# Patient Record
Sex: FEMALE | Race: WHITE | Employment: OTHER | URBAN - METROPOLITAN AREA
[De-identification: names, ages, dates, MRNs, and addresses within clinical notes are randomized per-mention and may not be internally consistent; named-entity substitution may affect disease eponyms.]

---

## 2017-03-16 PROBLEM — M54.50 ACUTE LEFT-SIDED LOW BACK PAIN WITHOUT SCIATICA: Status: ACTIVE | Noted: 2017-03-16

## 2017-03-16 PROBLEM — M25.552 LEFT HIP PAIN: Status: ACTIVE | Noted: 2017-03-16

## 2017-05-16 PROCEDURE — 86376 MICROSOMAL ANTIBODY EACH: CPT | Performed by: ALLERGY & IMMUNOLOGY

## 2017-05-16 PROCEDURE — 86800 THYROGLOBULIN ANTIBODY: CPT | Performed by: ALLERGY & IMMUNOLOGY

## 2017-05-16 PROCEDURE — 88184 FLOWCYTOMETRY/ TC 1 MARKER: CPT | Performed by: ALLERGY & IMMUNOLOGY

## 2017-05-18 PROBLEM — M54.50 ACUTE LEFT-SIDED LOW BACK PAIN WITHOUT SCIATICA: Status: RESOLVED | Noted: 2017-03-16 | Resolved: 2017-05-18

## 2017-05-18 PROBLEM — M25.552 LEFT HIP PAIN: Status: RESOLVED | Noted: 2017-03-16 | Resolved: 2017-05-18

## 2017-06-22 ENCOUNTER — OFFICE VISIT (OUTPATIENT)
Dept: FAMILY MEDICINE CLINIC | Facility: CLINIC | Age: 62
End: 2017-06-22

## 2017-06-22 VITALS
BODY MASS INDEX: 43.41 KG/M2 | TEMPERATURE: 98 F | SYSTOLIC BLOOD PRESSURE: 136 MMHG | HEART RATE: 84 BPM | RESPIRATION RATE: 20 BRPM | WEIGHT: 245 LBS | HEIGHT: 63 IN | DIASTOLIC BLOOD PRESSURE: 86 MMHG | OXYGEN SATURATION: 98 %

## 2017-06-22 DIAGNOSIS — N30.00 ACUTE CYSTITIS WITHOUT HEMATURIA: Primary | ICD-10-CM

## 2017-06-22 PROCEDURE — 81003 URINALYSIS AUTO W/O SCOPE: CPT | Performed by: NURSE PRACTITIONER

## 2017-06-22 PROCEDURE — 87088 URINE BACTERIA CULTURE: CPT | Performed by: NURSE PRACTITIONER

## 2017-06-22 PROCEDURE — 87186 SC STD MICRODIL/AGAR DIL: CPT | Performed by: NURSE PRACTITIONER

## 2017-06-22 PROCEDURE — 99213 OFFICE O/P EST LOW 20 MIN: CPT | Performed by: NURSE PRACTITIONER

## 2017-06-22 PROCEDURE — 87086 URINE CULTURE/COLONY COUNT: CPT | Performed by: NURSE PRACTITIONER

## 2017-06-22 RX ORDER — PHENAZOPYRIDINE HYDROCHLORIDE 200 MG/1
200 TABLET, FILM COATED ORAL 3 TIMES DAILY PRN
Qty: 9 TABLET | Refills: 0 | Status: SHIPPED | OUTPATIENT
Start: 2017-06-22 | End: 2017-09-13 | Stop reason: ALTCHOICE

## 2017-06-22 RX ORDER — NITROFURANTOIN 25; 75 MG/1; MG/1
CAPSULE ORAL
Qty: 14 CAPSULE | Refills: 0 | Status: SHIPPED | OUTPATIENT
Start: 2017-06-22 | End: 2017-09-13 | Stop reason: ALTCHOICE

## 2017-06-22 NOTE — ADDENDUM NOTE
Addended byClotilda Cheadle on: 6/22/2017 10:10 AM     Modules accepted: Orders, Medications, Level of Service

## 2017-06-22 NOTE — PROGRESS NOTES
Penelope Nguyen is a 57year old female. CHIEF COMPLAINT:   Patient presents with:  UTI      HPI:   Patient presents with symptoms of UTI. Reports 1 day history of urinary frequency and dysuria.   Denies flank pain, hematuria, nausea, vomiting, fever or malaise Calculus of kidney    • Esophageal reflux    • Anesthesia complication      NAUSEA AND MUCH EMESIS   • PONV (postoperative nausea and vomiting)       Social History:    Smoking Status: Never Smoker                      Smokeless Status: Never Used in your urinary tract, they can cause infection in the urethra (urethritis), the bladder (cystitis), or the kidneys (pyelonephritis). The most common place for an infection is in the bladder. This is called a bladder infection.  This is one of the most com birth control   Treatment  Bladder infections are diagnosed by a urine test. They are treated with antibiotics and usually clear up quickly without complications. Treatment helps prevent a more serious kidney infection.   Medicines  Medicines can help in th all symptoms are not gone after 3 days of treatment. This is especially important if you have repeat infections. If a culture was done, you will be told if your treatment needs to be changed. If directed, you can call to find out the results.   If X-rays w

## 2017-09-19 ENCOUNTER — HOSPITAL (OUTPATIENT)
Dept: OTHER | Age: 62
End: 2017-09-19
Attending: FAMILY MEDICINE

## 2017-09-26 ENCOUNTER — HOSPITAL ENCOUNTER (OUTPATIENT)
Facility: HOSPITAL | Age: 62
Discharge: HOME | End: 2017-09-26
Attending: SURGERY | Admitting: SURGERY
Payer: COMMERCIAL

## 2017-09-26 ENCOUNTER — ANESTHESIA (OUTPATIENT)
Dept: SURGERY | Facility: HOSPITAL | Age: 62
End: 2017-09-26
Payer: COMMERCIAL

## 2017-09-26 ENCOUNTER — ANESTHESIA EVENT (OUTPATIENT)
Dept: SURGERY | Facility: HOSPITAL | Age: 62
End: 2017-09-26
Payer: COMMERCIAL

## 2017-09-26 ENCOUNTER — SURGERY (OUTPATIENT)
Age: 62
End: 2017-09-26

## 2017-09-26 VITALS
BODY MASS INDEX: 43.28 KG/M2 | TEMPERATURE: 98 F | HEART RATE: 75 BPM | RESPIRATION RATE: 16 BRPM | HEIGHT: 64 IN | OXYGEN SATURATION: 92 % | SYSTOLIC BLOOD PRESSURE: 106 MMHG | WEIGHT: 253.5 LBS | DIASTOLIC BLOOD PRESSURE: 63 MMHG

## 2017-09-26 LAB
GLUCOSE BLD-MCNC: 130 MG/DL (ref 65–99)
GLUCOSE BLD-MCNC: 146 MG/DL (ref 65–99)

## 2017-09-26 PROCEDURE — 88333 PATH CONSLTJ SURG CYTO XM 1: CPT | Performed by: SURGERY

## 2017-09-26 PROCEDURE — 88331 PATH CONSLTJ SURG 1 BLK 1SPC: CPT | Performed by: SURGERY

## 2017-09-26 PROCEDURE — 82962 GLUCOSE BLOOD TEST: CPT

## 2017-09-26 PROCEDURE — 88307 TISSUE EXAM BY PATHOLOGIST: CPT | Performed by: SURGERY

## 2017-09-26 PROCEDURE — 0GTG0ZZ RESECTION OF LEFT THYROID GLAND LOBE, OPEN APPROACH: ICD-10-PCS | Performed by: SURGERY

## 2017-09-26 RX ORDER — HYDROMORPHONE HYDROCHLORIDE 1 MG/ML
INJECTION, SOLUTION INTRAMUSCULAR; INTRAVENOUS; SUBCUTANEOUS
Status: COMPLETED
Start: 2017-09-26 | End: 2017-09-26

## 2017-09-26 RX ORDER — HYDROCODONE BITARTRATE AND ACETAMINOPHEN 5; 325 MG/1; MG/1
1 TABLET ORAL AS NEEDED
Status: COMPLETED | OUTPATIENT
Start: 2017-09-26 | End: 2017-09-26

## 2017-09-26 RX ORDER — HYDROCODONE BITARTRATE AND ACETAMINOPHEN 5; 325 MG/1; MG/1
1-2 TABLET ORAL EVERY 4 HOURS PRN
Qty: 10 TABLET | Refills: 0 | Status: SHIPPED | OUTPATIENT
Start: 2017-09-26 | End: 2018-04-03 | Stop reason: ALTCHOICE

## 2017-09-26 RX ORDER — HYDROMORPHONE HYDROCHLORIDE 1 MG/ML
0.4 INJECTION, SOLUTION INTRAMUSCULAR; INTRAVENOUS; SUBCUTANEOUS EVERY 5 MIN PRN
Status: DISCONTINUED | OUTPATIENT
Start: 2017-09-26 | End: 2017-09-26

## 2017-09-26 RX ORDER — IBUPROFEN 800 MG/1
800 TABLET ORAL EVERY 8 HOURS PRN
Qty: 30 TABLET | Refills: 1 | Status: SHIPPED | OUTPATIENT
Start: 2017-09-26 | End: 2017-11-25

## 2017-09-26 RX ORDER — BUPIVACAINE HYDROCHLORIDE 5 MG/ML
INJECTION, SOLUTION EPIDURAL; INTRACAUDAL AS NEEDED
Status: DISCONTINUED | OUTPATIENT
Start: 2017-09-26 | End: 2017-09-26 | Stop reason: HOSPADM

## 2017-09-26 RX ORDER — ONDANSETRON 2 MG/ML
4 INJECTION INTRAMUSCULAR; INTRAVENOUS AS NEEDED
Status: DISCONTINUED | OUTPATIENT
Start: 2017-09-26 | End: 2017-09-26

## 2017-09-26 RX ORDER — DEXTROSE MONOHYDRATE 25 G/50ML
50 INJECTION, SOLUTION INTRAVENOUS
Status: DISCONTINUED | OUTPATIENT
Start: 2017-09-26 | End: 2017-09-26 | Stop reason: HOSPADM

## 2017-09-26 RX ORDER — SODIUM CHLORIDE, SODIUM LACTATE, POTASSIUM CHLORIDE, CALCIUM CHLORIDE 600; 310; 30; 20 MG/100ML; MG/100ML; MG/100ML; MG/100ML
INJECTION, SOLUTION INTRAVENOUS CONTINUOUS
Status: DISCONTINUED | OUTPATIENT
Start: 2017-09-26 | End: 2017-09-26

## 2017-09-26 RX ORDER — MEPERIDINE HYDROCHLORIDE 25 MG/ML
12.5 INJECTION INTRAMUSCULAR; INTRAVENOUS; SUBCUTANEOUS AS NEEDED
Status: DISCONTINUED | OUTPATIENT
Start: 2017-09-26 | End: 2017-09-26

## 2017-09-26 RX ORDER — HYDROCODONE BITARTRATE AND ACETAMINOPHEN 5; 325 MG/1; MG/1
2 TABLET ORAL AS NEEDED
Status: COMPLETED | OUTPATIENT
Start: 2017-09-26 | End: 2017-09-26

## 2017-09-26 RX ORDER — METOCLOPRAMIDE HYDROCHLORIDE 5 MG/ML
10 INJECTION INTRAMUSCULAR; INTRAVENOUS AS NEEDED
Status: DISCONTINUED | OUTPATIENT
Start: 2017-09-26 | End: 2017-09-26

## 2017-09-26 RX ORDER — ONDANSETRON 2 MG/ML
INJECTION INTRAMUSCULAR; INTRAVENOUS
Status: COMPLETED
Start: 2017-09-26 | End: 2017-09-26

## 2017-09-26 RX ORDER — DEXTROSE MONOHYDRATE 25 G/50ML
50 INJECTION, SOLUTION INTRAVENOUS
Status: DISCONTINUED | OUTPATIENT
Start: 2017-09-26 | End: 2017-09-26

## 2017-09-26 RX ORDER — MAGNESIUM HYDROXIDE 1200 MG/15ML
LIQUID ORAL CONTINUOUS PRN
Status: DISCONTINUED | OUTPATIENT
Start: 2017-09-26 | End: 2017-09-26

## 2017-09-26 RX ORDER — NALOXONE HYDROCHLORIDE 0.4 MG/ML
80 INJECTION, SOLUTION INTRAMUSCULAR; INTRAVENOUS; SUBCUTANEOUS AS NEEDED
Status: DISCONTINUED | OUTPATIENT
Start: 2017-09-26 | End: 2017-09-26

## 2017-09-26 RX ORDER — MORPHINE SULFATE 2 MG/ML
2 INJECTION, SOLUTION INTRAMUSCULAR; INTRAVENOUS EVERY 5 MIN PRN
Status: DISCONTINUED | OUTPATIENT
Start: 2017-09-26 | End: 2017-09-26

## 2017-09-26 RX ORDER — DEXAMETHASONE SODIUM PHOSPHATE 4 MG/ML
4 VIAL (ML) INJECTION AS NEEDED
Status: DISCONTINUED | OUTPATIENT
Start: 2017-09-26 | End: 2017-09-26

## 2017-09-26 NOTE — ANESTHESIA PREPROCEDURE EVALUATION
PRE-OP EVALUATION    Patient Name: Guillermina Rodriguez    Pre-op Diagnosis: THYROID NODULE     Procedure(s):  LEFT THYROID LOBECTOMY WITH INTRAOPERATIVE FROZEN SECTION, NERVE MONITORING, POSSIBLE TOTAL THYROIDECTOMY    Surgeon(s) and Role:     Karin Griffin MD tablet Rfl: 1   glimepiride 4 MG Oral Tab Take 1 tablet (4 mg total) by mouth 2 (two) times daily. Disp: 180 tablet Rfl: 3   aspirin 81 MG Oral Tab EC Take 81 mg by mouth daily.  Disp:  Rfl:    omega-3 fatty acids 1000 MG Oral Cap Take 1,000 mg by mouth gina 09/10/2017    (H) 09/10/2017   CA 9.6 09/10/2017            Airway      Mallampati: III  Mouth opening: 3 FB  TM distance: < 4 cm  Neck ROM: full Cardiovascular      Rhythm: regular  Rate: normal     Dental    No notable dental history.          Pulm

## 2017-09-26 NOTE — ANESTHESIA POSTPROCEDURE EVALUATION
[address] Patient Status:  Outpatient in a Bed   Age/Gender 57year old female MRN [id number]   [facility] SURGERY Attending Deon Duarte MD   Hosp Day # 0 PCP Yemi Galdamez DO       Anesthesia Post-op Note    Procedure

## 2017-09-26 NOTE — HISTORICAL OFFICE NOTE
The above referenced H&P 9/15/2017 was reviewed by Amy Yu MD on 9/26/2017, the patient was examined and no significant changes have occurred in the patient's condition since the H&P was performed.   Risks and benefits were discussed, proceed with pr

## 2017-09-27 NOTE — OPERATIVE REPORT
[facility]    PATIENT'S NAME: Elzbieta Ray   ATTENDING PHYSICIAN: Ina Rivera M.D. OPERATING PHYSICIAN: Ina Rivera M.D.    PATIENT ACCOUNT#:   [de-identified]    LOCATION:  PREOPASCC EH PRE ASCC 3 EDWP 10  MEDICAL RECORD #:   [id number]       DATE OF SPIKE hyperextended, then the area was prepped and draped into a sterile field. Lower Kocher neck incision was made with a 15 blade knife. Electric Bovie cautery was used to separate the subcutaneous tissue, and superior and inferior flaps raised.   Deep cervic

## 2017-10-03 NOTE — PROGRESS NOTES
Hay Chery, please notify patient with final pathology showed no cancer  Start on levothyroxine 150 mcg levothyroxine daily  Repeat TSH in 4 weeks  Please give instructions on how to take the pill

## 2017-11-20 PROCEDURE — 82043 UR ALBUMIN QUANTITATIVE: CPT | Performed by: FAMILY MEDICINE

## 2017-11-20 PROCEDURE — 82570 ASSAY OF URINE CREATININE: CPT | Performed by: FAMILY MEDICINE

## 2018-04-03 ENCOUNTER — OFFICE VISIT (OUTPATIENT)
Dept: FAMILY MEDICINE CLINIC | Facility: CLINIC | Age: 63
End: 2018-04-03

## 2018-04-03 VITALS
SYSTOLIC BLOOD PRESSURE: 138 MMHG | TEMPERATURE: 98 F | WEIGHT: 259 LBS | HEIGHT: 63 IN | HEART RATE: 96 BPM | DIASTOLIC BLOOD PRESSURE: 88 MMHG | OXYGEN SATURATION: 97 % | RESPIRATION RATE: 16 BRPM | BODY MASS INDEX: 45.89 KG/M2

## 2018-04-03 DIAGNOSIS — E11.9 TYPE 2 DIABETES MELLITUS WITHOUT COMPLICATION, WITHOUT LONG-TERM CURRENT USE OF INSULIN (HCC): Primary | ICD-10-CM

## 2018-04-03 DIAGNOSIS — N30.01 ACUTE CYSTITIS WITH HEMATURIA: ICD-10-CM

## 2018-04-03 DIAGNOSIS — R30.0 DYSURIA: ICD-10-CM

## 2018-04-03 PROCEDURE — 81003 URINALYSIS AUTO W/O SCOPE: CPT | Performed by: NURSE PRACTITIONER

## 2018-04-03 PROCEDURE — 87088 URINE BACTERIA CULTURE: CPT | Performed by: NURSE PRACTITIONER

## 2018-04-03 PROCEDURE — 87086 URINE CULTURE/COLONY COUNT: CPT | Performed by: NURSE PRACTITIONER

## 2018-04-03 PROCEDURE — 99213 OFFICE O/P EST LOW 20 MIN: CPT | Performed by: NURSE PRACTITIONER

## 2018-04-03 PROCEDURE — 87186 SC STD MICRODIL/AGAR DIL: CPT | Performed by: NURSE PRACTITIONER

## 2018-04-03 RX ORDER — PHENAZOPYRIDINE HYDROCHLORIDE 200 MG/1
200 TABLET, FILM COATED ORAL 3 TIMES DAILY PRN
Qty: 6 TABLET | Refills: 0 | Status: SHIPPED | OUTPATIENT
Start: 2018-04-03 | End: 2018-04-05

## 2018-04-03 RX ORDER — NITROFURANTOIN 25; 75 MG/1; MG/1
100 CAPSULE ORAL 2 TIMES DAILY
Qty: 14 CAPSULE | Refills: 0 | Status: SHIPPED | OUTPATIENT
Start: 2018-04-03 | End: 2018-04-10

## 2018-04-03 NOTE — PROGRESS NOTES
CHIEF COMPLAINT:   Patient presents with:  Urinary Symptoms (urologic)      HPI:   Deshawn Gongora is a 57year old female who presents with symptoms of UTI. Complaining of urinary frequency, urgency, dysuria for 1 day.  Symptoms have been worsening since onse TiZANidine HCl 4 MG Oral Tab Take 1 tablet (4 mg total) by mouth every 8 (eight) hours as needed (muscle pain/spasm).  Disp: 10 tablet Rfl: 0   predniSONE 20 MG Oral Tab 2 tabs po qd for 7 days for severe hives or swelling Disp: 14 tablet Rfl: 0      Past M GLUCOSE (URINE DIPSTICK) negative mg/dL   BILIRUBIN negative Negative   KETONES (URINE DIPSTICK) negative Negative mg/dL   SPECIFIC GRAVITY 1.030 1.005 - 1.030   OCCULT BLOOD trace-lysed (A) Negative   PH, URINE 6.5 4.5 - 8.0   PROTEIN (URINE DIPSTICK) 30 - URINE CULTURE, ROUTINE; Future    3.  Type 2 diabetes mellitus without complication, without long-term current use of insulin (HCC)  To continue to closely monitor BG      Patient Instructions       Bladder Infection, Female (Adult)    Urine is normally d The most common cause of bladder infections is bacteria from the bowels. The bacteria get onto the skin around the opening of the urethra. From there, they can get into the urine and travel up to the bladder, causing inflammation and infection.  This usuall · Urinate more often. Don't try to hold urine in for a long time. · Wear loose-fitting clothes and cotton underwear. Avoid tight-fitting pants. · Improve your diet and prevent constipation.  Eat more fresh fruit and vegetables, and fiber, and less junk an The patient is asked to return in 3 days if not better. Call if fever, vomiting, worsening symptoms.

## 2018-04-03 NOTE — PATIENT INSTRUCTIONS
Bladder Infection, Female (Adult)    Urine is normally doesn't have any bacteria in it. But bacteria can get into the urinary tract from the skin around the rectum. Or they can travel in the blood from elsewhere in the body.  Once they are in your urina · Bowel incontinence  · Pregnancy  · Procedures such as having a catheter inserted  · Older age  · Not emptying your bladder. This can allow bacteria a chance to grow in your urine.   · Dehydration  · Constipation  · Sex  · Use of a diaphragm for birth cont · Avoid caffeine, alcohol, and spicy foods. These can irritate your bladder. · Urinate right after intercourse to flush out your bladder. · If you use birth control pills and have frequent bladder infections, discuss it with your doctor.   Follow-up care

## 2018-09-20 ENCOUNTER — HOSPITAL (OUTPATIENT)
Dept: OTHER | Age: 63
End: 2018-09-20
Attending: FAMILY MEDICINE

## 2018-10-03 PROCEDURE — 87081 CULTURE SCREEN ONLY: CPT | Performed by: FAMILY MEDICINE

## 2018-10-09 PROCEDURE — 81001 URINALYSIS AUTO W/SCOPE: CPT | Performed by: FAMILY MEDICINE

## 2018-10-09 PROCEDURE — 87077 CULTURE AEROBIC IDENTIFY: CPT | Performed by: FAMILY MEDICINE

## 2018-10-09 PROCEDURE — 87086 URINE CULTURE/COLONY COUNT: CPT | Performed by: FAMILY MEDICINE

## 2018-10-30 ENCOUNTER — ANESTHESIA EVENT (OUTPATIENT)
Dept: SURGERY | Facility: HOSPITAL | Age: 63
End: 2018-10-30

## 2018-10-30 NOTE — H&P
[facility]  History & Physical    Chiara Warner Patient Status:  Surgery Admit    1955 MRN [id number]   [facility] SURGERY Attending Reene Arias MD   Hosp Day # 0 PCP Teresita Zacarias DO     Date of Admission:  (Not on file)    Hi Medications:    No medications prior to admission. Physical Exam:   General: Alert, orientated x3. Cooperative. No apparent distress. Vital Signs:  Height 5' 4\" (1.626 m), weight 250 lb (113.4 kg), last menstrual period 02/10/2011.   HEENT: Exam is u

## 2018-10-31 ENCOUNTER — ANESTHESIA (OUTPATIENT)
Dept: SURGERY | Facility: HOSPITAL | Age: 63
End: 2018-10-31

## 2018-10-31 ENCOUNTER — HOSPITAL ENCOUNTER (INPATIENT)
Facility: HOSPITAL | Age: 63
LOS: 4 days | Discharge: HOME HEALTH | DRG: 470 | End: 2018-11-04
Attending: ORTHOPAEDIC SURGERY | Admitting: ORTHOPAEDIC SURGERY
Payer: MEDICARE

## 2018-10-31 DIAGNOSIS — M17.11 PRIMARY OSTEOARTHRITIS OF RIGHT KNEE: ICD-10-CM

## 2018-10-31 PROCEDURE — 82962 GLUCOSE BLOOD TEST: CPT

## 2018-10-31 PROCEDURE — 97161 PT EVAL LOW COMPLEX 20 MIN: CPT

## 2018-10-31 PROCEDURE — 88305 TISSUE EXAM BY PATHOLOGIST: CPT | Performed by: ORTHOPAEDIC SURGERY

## 2018-10-31 PROCEDURE — 88311 DECALCIFY TISSUE: CPT | Performed by: ORTHOPAEDIC SURGERY

## 2018-10-31 PROCEDURE — 86850 RBC ANTIBODY SCREEN: CPT | Performed by: ORTHOPAEDIC SURGERY

## 2018-10-31 PROCEDURE — 86900 BLOOD TYPING SEROLOGIC ABO: CPT | Performed by: ORTHOPAEDIC SURGERY

## 2018-10-31 PROCEDURE — 3E0T3BZ INTRODUCTION OF ANESTHETIC AGENT INTO PERIPHERAL NERVES AND PLEXI, PERCUTANEOUS APPROACH: ICD-10-PCS | Performed by: ANESTHESIOLOGY

## 2018-10-31 PROCEDURE — 97530 THERAPEUTIC ACTIVITIES: CPT

## 2018-10-31 PROCEDURE — 76942 ECHO GUIDE FOR BIOPSY: CPT | Performed by: ORTHOPAEDIC SURGERY

## 2018-10-31 PROCEDURE — 86901 BLOOD TYPING SEROLOGIC RH(D): CPT | Performed by: ORTHOPAEDIC SURGERY

## 2018-10-31 PROCEDURE — 0SRC0J9 REPLACEMENT OF RIGHT KNEE JOINT WITH SYNTHETIC SUBSTITUTE, CEMENTED, OPEN APPROACH: ICD-10-PCS | Performed by: ORTHOPAEDIC SURGERY

## 2018-10-31 DEVICE — ATTUNE PATELLA MEDIALIZED DOME 35MM CEMENTED AOX
Type: IMPLANTABLE DEVICE | Site: KNEE | Status: FUNCTIONAL
Brand: ATTUNE

## 2018-10-31 DEVICE — SIGMA LCS HIGH PERFORMANCE INSTRUMENTS STERILE THREADED PINS
Type: IMPLANTABLE DEVICE | Status: FUNCTIONAL
Brand: SIGMA LCS HIGH PERFORMANCE

## 2018-10-31 DEVICE — ATTUNE KNEE SYSTEM TIBIAL INSERT ROTATING PLATFORM POSTERIOR STABILIZED 4 10MM AOX
Type: IMPLANTABLE DEVICE | Site: KNEE | Status: FUNCTIONAL
Brand: ATTUNE

## 2018-10-31 DEVICE — SIGMA LCS HIGH PERFORMANCE STERILE THREADED HEADED PINS
Type: IMPLANTABLE DEVICE | Status: FUNCTIONAL
Brand: SIGMA LCS HIGH PERFORMANCE

## 2018-10-31 DEVICE — ATTUNE KNEE SYSTEM TIBIAL BASE ROTATING PLATFORM SIZE 4 CEMENTED
Type: IMPLANTABLE DEVICE | Site: KNEE | Status: FUNCTIONAL
Brand: ATTUNE

## 2018-10-31 DEVICE — SMARTSET HV HIGH VISCOSITY BONE CEMENT 40G
Type: IMPLANTABLE DEVICE | Site: KNEE | Status: FUNCTIONAL
Brand: SMARTSET

## 2018-10-31 DEVICE — ATTUNE KNEE SYSTEM FEMORAL POSTERIOR STABILIZED SIZE 4 RIGHT CEMENTED
Type: IMPLANTABLE DEVICE | Site: KNEE | Status: FUNCTIONAL
Brand: ATTUNE

## 2018-10-31 RX ORDER — DIPHENHYDRAMINE HYDROCHLORIDE 50 MG/ML
12.5 INJECTION INTRAMUSCULAR; INTRAVENOUS EVERY 4 HOURS PRN
Status: DISCONTINUED | OUTPATIENT
Start: 2018-10-31 | End: 2018-11-04

## 2018-10-31 RX ORDER — DEXTROSE MONOHYDRATE 25 G/50ML
50 INJECTION, SOLUTION INTRAVENOUS
Status: DISCONTINUED | OUTPATIENT
Start: 2018-10-31 | End: 2018-11-04

## 2018-10-31 RX ORDER — POLYETHYLENE GLYCOL 3350 17 G/17G
17 POWDER, FOR SOLUTION ORAL DAILY
Status: DISCONTINUED | OUTPATIENT
Start: 2018-11-01 | End: 2018-11-04

## 2018-10-31 RX ORDER — SCOLOPAMINE TRANSDERMAL SYSTEM 1 MG/1
1 PATCH, EXTENDED RELEASE TRANSDERMAL ONCE
Status: COMPLETED | OUTPATIENT
Start: 2018-10-31 | End: 2018-11-03

## 2018-10-31 RX ORDER — CEFAZOLIN SODIUM/WATER 2 G/20 ML
2 SYRINGE (ML) INTRAVENOUS EVERY 8 HOURS
Status: COMPLETED | OUTPATIENT
Start: 2018-10-31 | End: 2018-11-01

## 2018-10-31 RX ORDER — NALOXONE HYDROCHLORIDE 0.4 MG/ML
80 INJECTION, SOLUTION INTRAMUSCULAR; INTRAVENOUS; SUBCUTANEOUS AS NEEDED
Status: DISCONTINUED | OUTPATIENT
Start: 2018-10-31 | End: 2018-10-31 | Stop reason: HOSPADM

## 2018-10-31 RX ORDER — ACETAMINOPHEN 325 MG/1
TABLET ORAL
Status: COMPLETED
Start: 2018-10-31 | End: 2018-10-31

## 2018-10-31 RX ORDER — KETOROLAC TROMETHAMINE 15 MG/ML
15 INJECTION, SOLUTION INTRAMUSCULAR; INTRAVENOUS EVERY 6 HOURS
Status: COMPLETED | OUTPATIENT
Start: 2018-10-31 | End: 2018-11-01

## 2018-10-31 RX ORDER — ATORVASTATIN CALCIUM 20 MG/1
20 TABLET, FILM COATED ORAL NIGHTLY
Status: DISCONTINUED | OUTPATIENT
Start: 2018-10-31 | End: 2018-11-04

## 2018-10-31 RX ORDER — DEXTROSE MONOHYDRATE 25 G/50ML
50 INJECTION, SOLUTION INTRAVENOUS
Status: DISCONTINUED | OUTPATIENT
Start: 2018-10-31 | End: 2018-10-31 | Stop reason: HOSPADM

## 2018-10-31 RX ORDER — MORPHINE SULFATE 4 MG/ML
2 INJECTION, SOLUTION INTRAMUSCULAR; INTRAVENOUS EVERY 2 HOUR PRN
Status: DISCONTINUED | OUTPATIENT
Start: 2018-10-31 | End: 2018-11-04

## 2018-10-31 RX ORDER — HYDROCODONE BITARTRATE AND ACETAMINOPHEN 10; 325 MG/1; MG/1
1-2 TABLET ORAL EVERY 4 HOURS PRN
Qty: 80 TABLET | Refills: 0 | Status: SHIPPED | OUTPATIENT
Start: 2018-10-31 | End: 2018-11-12

## 2018-10-31 RX ORDER — OXYCODONE HYDROCHLORIDE 10 MG/1
10 TABLET ORAL EVERY 4 HOURS PRN
Status: DISPENSED | OUTPATIENT
Start: 2018-10-31 | End: 2018-11-02

## 2018-10-31 RX ORDER — DOCUSATE SODIUM 100 MG/1
100 CAPSULE, LIQUID FILLED ORAL 2 TIMES DAILY
Status: DISCONTINUED | OUTPATIENT
Start: 2018-10-31 | End: 2018-11-04

## 2018-10-31 RX ORDER — ATORVASTATIN CALCIUM 20 MG/1
20 TABLET, FILM COATED ORAL NIGHTLY
COMMUNITY
End: 2019-04-10

## 2018-10-31 RX ORDER — OXYCODONE HCL 10 MG/1
10 TABLET, FILM COATED, EXTENDED RELEASE ORAL
Status: DISCONTINUED | OUTPATIENT
Start: 2018-10-31 | End: 2018-11-01

## 2018-10-31 RX ORDER — SODIUM PHOSPHATE, DIBASIC AND SODIUM PHOSPHATE, MONOBASIC 7; 19 G/133ML; G/133ML
1 ENEMA RECTAL ONCE AS NEEDED
Status: DISCONTINUED | OUTPATIENT
Start: 2018-10-31 | End: 2018-11-04

## 2018-10-31 RX ORDER — SODIUM CHLORIDE, SODIUM LACTATE, POTASSIUM CHLORIDE, CALCIUM CHLORIDE 600; 310; 30; 20 MG/100ML; MG/100ML; MG/100ML; MG/100ML
INJECTION, SOLUTION INTRAVENOUS CONTINUOUS
Status: DISCONTINUED | OUTPATIENT
Start: 2018-10-31 | End: 2018-11-04

## 2018-10-31 RX ORDER — BIOTIN 1 MG
5000 TABLET ORAL DAILY
Status: DISCONTINUED | OUTPATIENT
Start: 2018-10-31 | End: 2018-10-31 | Stop reason: RX

## 2018-10-31 RX ORDER — ACETAMINOPHEN 500 MG
1000 TABLET ORAL ONCE
Status: DISCONTINUED | OUTPATIENT
Start: 2018-10-31 | End: 2018-10-31 | Stop reason: HOSPADM

## 2018-10-31 RX ORDER — OXYCODONE HCL 10 MG/1
10 TABLET, FILM COATED, EXTENDED RELEASE ORAL
Status: COMPLETED | OUTPATIENT
Start: 2018-10-31 | End: 2018-10-31

## 2018-10-31 RX ORDER — GLIMEPIRIDE 4 MG/1
4 TABLET ORAL 2 TIMES DAILY
COMMUNITY
End: 2018-12-03

## 2018-10-31 RX ORDER — METOPROLOL SUCCINATE 50 MG/1
50 TABLET, EXTENDED RELEASE ORAL DAILY
COMMUNITY
End: 2019-03-28

## 2018-10-31 RX ORDER — METOPROLOL SUCCINATE 50 MG/1
50 TABLET, EXTENDED RELEASE ORAL
Status: DISCONTINUED | OUTPATIENT
Start: 2018-11-01 | End: 2018-11-04

## 2018-10-31 RX ORDER — TRAMADOL HYDROCHLORIDE 50 MG/1
50 TABLET ORAL EVERY 6 HOURS
Status: DISCONTINUED | OUTPATIENT
Start: 2018-10-31 | End: 2018-11-04

## 2018-10-31 RX ORDER — LOSARTAN POTASSIUM 50 MG/1
50 TABLET ORAL DAILY
COMMUNITY
End: 2019-03-28

## 2018-10-31 RX ORDER — METOCLOPRAMIDE HYDROCHLORIDE 5 MG/ML
10 INJECTION INTRAMUSCULAR; INTRAVENOUS EVERY 6 HOURS PRN
Status: ACTIVE | OUTPATIENT
Start: 2018-10-31 | End: 2018-11-02

## 2018-10-31 RX ORDER — OXYCODONE HYDROCHLORIDE 15 MG/1
15 TABLET ORAL EVERY 4 HOURS PRN
Status: DISPENSED | OUTPATIENT
Start: 2018-10-31 | End: 2018-11-02

## 2018-10-31 RX ORDER — MORPHINE SULFATE 4 MG/ML
1 INJECTION, SOLUTION INTRAMUSCULAR; INTRAVENOUS EVERY 2 HOUR PRN
Status: DISCONTINUED | OUTPATIENT
Start: 2018-10-31 | End: 2018-11-04

## 2018-10-31 RX ORDER — HYDROCHLOROTHIAZIDE 25 MG/1
25 TABLET ORAL DAILY
COMMUNITY
End: 2019-03-28

## 2018-10-31 RX ORDER — CEFAZOLIN SODIUM/WATER 2 G/20 ML
2 SYRINGE (ML) INTRAVENOUS ONCE
Status: COMPLETED | OUTPATIENT
Start: 2018-10-31 | End: 2018-10-31

## 2018-10-31 RX ORDER — TIZANIDINE 2 MG/1
2 TABLET ORAL 3 TIMES DAILY PRN
Status: DISCONTINUED | OUTPATIENT
Start: 2018-10-31 | End: 2018-11-04

## 2018-10-31 RX ORDER — MORPHINE SULFATE 4 MG/ML
4 INJECTION, SOLUTION INTRAMUSCULAR; INTRAVENOUS EVERY 2 HOUR PRN
Status: DISCONTINUED | OUTPATIENT
Start: 2018-10-31 | End: 2018-11-04

## 2018-10-31 RX ORDER — ACETAMINOPHEN 325 MG/1
650 TABLET ORAL 4 TIMES DAILY
Status: DISPENSED | OUTPATIENT
Start: 2018-10-31 | End: 2018-11-02

## 2018-10-31 RX ORDER — PANTOPRAZOLE SODIUM 40 MG/1
40 TABLET, DELAYED RELEASE ORAL
Status: DISCONTINUED | OUTPATIENT
Start: 2018-11-01 | End: 2018-11-04

## 2018-10-31 RX ORDER — ZOLPIDEM TARTRATE 5 MG/1
5 TABLET ORAL NIGHTLY PRN
Status: DISCONTINUED | OUTPATIENT
Start: 2018-10-31 | End: 2018-11-04

## 2018-10-31 RX ORDER — DIPHENHYDRAMINE HCL 25 MG
25 CAPSULE ORAL EVERY 4 HOURS PRN
Status: DISCONTINUED | OUTPATIENT
Start: 2018-10-31 | End: 2018-11-04

## 2018-10-31 RX ORDER — SODIUM CHLORIDE, SODIUM LACTATE, POTASSIUM CHLORIDE, CALCIUM CHLORIDE 600; 310; 30; 20 MG/100ML; MG/100ML; MG/100ML; MG/100ML
INJECTION, SOLUTION INTRAVENOUS CONTINUOUS
Status: DISCONTINUED | OUTPATIENT
Start: 2018-10-31 | End: 2018-10-31 | Stop reason: HOSPADM

## 2018-10-31 RX ORDER — SENNOSIDES 8.6 MG
17.2 TABLET ORAL NIGHTLY
Status: DISCONTINUED | OUTPATIENT
Start: 2018-10-31 | End: 2018-11-04

## 2018-10-31 RX ORDER — MELATONIN
325
Status: DISCONTINUED | OUTPATIENT
Start: 2018-11-01 | End: 2018-11-04

## 2018-10-31 RX ORDER — ACETAMINOPHEN 325 MG/1
650 TABLET ORAL ONCE
Status: COMPLETED | OUTPATIENT
Start: 2018-10-31 | End: 2018-10-31

## 2018-10-31 RX ORDER — DIPHENHYDRAMINE HYDROCHLORIDE 50 MG/ML
25 INJECTION INTRAMUSCULAR; INTRAVENOUS ONCE AS NEEDED
Status: ACTIVE | OUTPATIENT
Start: 2018-10-31 | End: 2018-10-31

## 2018-10-31 RX ORDER — MORPHINE SULFATE 4 MG/ML
2 INJECTION, SOLUTION INTRAMUSCULAR; INTRAVENOUS EVERY 5 MIN PRN
Status: DISCONTINUED | OUTPATIENT
Start: 2018-10-31 | End: 2018-10-31 | Stop reason: HOSPADM

## 2018-10-31 RX ORDER — ONDANSETRON 2 MG/ML
4 INJECTION INTRAMUSCULAR; INTRAVENOUS AS NEEDED
Status: DISCONTINUED | OUTPATIENT
Start: 2018-10-31 | End: 2018-10-31 | Stop reason: HOSPADM

## 2018-10-31 RX ORDER — ONDANSETRON 2 MG/ML
4 INJECTION INTRAMUSCULAR; INTRAVENOUS EVERY 4 HOURS PRN
Status: DISPENSED | OUTPATIENT
Start: 2018-10-31 | End: 2018-11-02

## 2018-10-31 RX ORDER — OXYCODONE HYDROCHLORIDE 5 MG/1
5 TABLET ORAL EVERY 4 HOURS PRN
Status: ACTIVE | OUTPATIENT
Start: 2018-10-31 | End: 2018-11-02

## 2018-10-31 RX ORDER — POLYETHYLENE GLYCOL 3350 17 G/17G
17 POWDER, FOR SOLUTION ORAL DAILY PRN
Status: DISCONTINUED | OUTPATIENT
Start: 2018-10-31 | End: 2018-11-04

## 2018-10-31 RX ORDER — CETIRIZINE HYDROCHLORIDE 10 MG/1
10 TABLET ORAL DAILY
Status: DISCONTINUED | OUTPATIENT
Start: 2018-11-01 | End: 2018-11-04

## 2018-10-31 RX ORDER — BISACODYL 10 MG
10 SUPPOSITORY, RECTAL RECTAL
Status: DISCONTINUED | OUTPATIENT
Start: 2018-10-31 | End: 2018-11-04

## 2018-10-31 NOTE — PHYSICAL THERAPY NOTE
PHYSICAL THERAPY KNEE EVALUATION - INPATIENT     Room Number: 361/361-A  Evaluation Date: 10/31/2018  Type of Evaluation: Initial  Physician Order: PT Eval and Treat    Presenting Problem: S/p Cemented  Right TKA on 10/31/18  Reason for Therapy: Mobility D heels.\" Patient was participatory & motivated.  was present. Patient self-stated goal is to be able to walk pain free.     OBJECTIVE  Precautions: (Surgical)  Fall Risk: High fall risk    WEIGHT BEARING RESTRICTION  Weight Bearing Restriction: R Need to walk in hospital room?: A Lot   -   Climbing 3-5 steps with a railing?: None       AM-PAC Score:  Raw Score: 17   PT Approx Degree of Impairment Score: 50.57%   Standardized Score (AM-PAC Scale): 42.13   CMS Modifier (G-Code): CK    FUNCTIONAL ABIL Pertinent comorbidities and personal factors impacting therapy include DM,Angiodema,h/o Left TKA in 2016 .   In this PT evaluation, the patient presents with the following impairments decreased ROM & strength in Right knee,pain in right knee to minimal @ villalobos supervision    Goal #5    AROM 0 degrees extension to 75 degrees flexion      Goal #6        Goal Comments: Goals established on 10/31/2018

## 2018-10-31 NOTE — PROGRESS NOTES
Pharmacy Note: Dietary Supplement Discontinuation Per Policy    Biotin has been discontinued on [employer] per policy. This supplement may be restarted upon discharge using the medication reconciliation process.     Thank you,   Vikash Huerta, PharmD  1

## 2018-10-31 NOTE — ANESTHESIA PREPROCEDURE EVALUATION
PRE-OP EVALUATION    Patient Name: Chiara Warner    Pre-op Diagnosis: Primary osteoarthritis of right knee [M17.11]    Procedure(s):  RIGHT TOTAL KNEE REPLACEMENT    Surgeon(s) and Role:     Lorene Cobb MD - Primary    Pre-op vitals reviewed.   Temp: 9 needed. Disp: 80 tablet Rfl: 0   apixaban 5 MG Oral Tab Take 0.5 tablets (2.5 mg total) by mouth 2 (two) times daily. Disp: 12 tablet Rfl: 0   Esomeprazole Magnesium 20 MG Oral Capsule Delayed Release Take 20 mg by mouth 3 (three) times a week.  M/W/SAT Dis or equivalent per week      Comment: cage score 0      Drug use: No     Available pre-op labs reviewed.   Lab Results   Component Value Date    WBC 9.19 09/10/2018    RBC 4.64 09/10/2018    HGB 13.1 09/10/2018    HCT 41.3 09/10/2018    MCV 89.0 09/10/2018

## 2018-10-31 NOTE — CONSULTS
[facility] Hospitalist H&P       CC: medicine consult for comanagement of medical conditions     PCP: Paige Norton DO    History of Present Illness: Patient is a 70year old female with PMH sig for HTN, HL, DM and GERD is now POD # 0 s/p R TKA.   There were no Potassium 50 MG Oral Tab Take 50 mg by mouth daily. Disp:  Rfl:    hydrochlorothiazide 25 MG Oral Tab Take 25 mg by mouth daily. Disp:  Rfl:    Metoprolol Succinate ER 50 MG Oral Tablet 24 Hr Take 50 mg by mouth daily.  Disp:  Rfl:    HYDROcodone-acetaminop obvious abnormality, atraumatic. Eyes:  Sclera anicteric, No conjunctival pallor, EOMs intact. Nose: Nares normal. Septum midline. Mucosa normal. No drainage.    Throat: Lips, mucosa, and tongue normal. Teeth and gums normal.   Neck: Supple, symmetrica will schedule daily miralax given she's had severe narcotic constipation in past    HTN  -hold losartan and HCTZ as bp won't allow presently  -cont BB    HL  -cont statin    DM2  -A1c 7.3  -hold PO hypoglycemics  -SSI and accuchecks    Dispo - per ortho

## 2018-10-31 NOTE — OPERATIVE REPORT
PREOPERATIVE DIAGNOSIS: Right knee osteoarthritis. POSTOPERATIVE DIAGNOSIS: Right knee osteoarthritis. PROCEDURE PERFORMED: Cemented right total knee arthroplasty. SURGEON:  Pedro Tena M.D.   FIRST ASSISTANT: Aury Mcarthur PA-C.   SECOND ASSISTANT the patella. Distal femoral condyle drill holes were made using the trial template. Final components the same size as the trials were utilized. Trial components were removed. Bony surfaces were irrigated and dried.  Final components were precoated with

## 2018-10-31 NOTE — PLAN OF CARE
Pt arrived from PACU on bed. VSS. Denies nausea or pain. Educated on fall precautions. Oriented to room and call light system. Paged Dr. Pastor Foss re: new consult. Will monitor.

## 2018-11-01 PROCEDURE — 97535 SELF CARE MNGMENT TRAINING: CPT

## 2018-11-01 PROCEDURE — 97150 GROUP THERAPEUTIC PROCEDURES: CPT

## 2018-11-01 PROCEDURE — 97165 OT EVAL LOW COMPLEX 30 MIN: CPT

## 2018-11-01 PROCEDURE — 97116 GAIT TRAINING THERAPY: CPT

## 2018-11-01 PROCEDURE — 85027 COMPLETE CBC AUTOMATED: CPT | Performed by: ORTHOPAEDIC SURGERY

## 2018-11-01 PROCEDURE — 82962 GLUCOSE BLOOD TEST: CPT

## 2018-11-01 RX ORDER — HYDROCHLOROTHIAZIDE 25 MG/1
25 TABLET ORAL DAILY
Status: DISCONTINUED | OUTPATIENT
Start: 2018-11-01 | End: 2018-11-04

## 2018-11-01 RX ORDER — HYDROCODONE BITARTRATE AND ACETAMINOPHEN 10; 325 MG/1; MG/1
1 TABLET ORAL EVERY 4 HOURS PRN
Status: DISCONTINUED | OUTPATIENT
Start: 2018-11-02 | End: 2018-11-04

## 2018-11-01 RX ORDER — HYDROCODONE BITARTRATE AND ACETAMINOPHEN 10; 325 MG/1; MG/1
2 TABLET ORAL EVERY 4 HOURS PRN
Status: DISCONTINUED | OUTPATIENT
Start: 2018-11-02 | End: 2018-11-04

## 2018-11-01 RX ORDER — ONDANSETRON 4 MG/1
4 TABLET, FILM COATED ORAL EVERY 8 HOURS PRN
Qty: 20 TABLET | Refills: 2 | Status: SHIPPED | OUTPATIENT
Start: 2018-11-01 | End: 2018-11-16

## 2018-11-01 RX ORDER — LOSARTAN POTASSIUM 50 MG/1
50 TABLET ORAL DAILY
Status: DISCONTINUED | OUTPATIENT
Start: 2018-11-01 | End: 2018-11-04

## 2018-11-01 NOTE — HOME CARE LIAISON
MET WITH PTNT AND OFFERED CHOICE  OF AGENCIES. PTNT AGREEABLE TO [facility]. MET WITH PTNT TO DISCUSS HOME HEALTH SERVICES AND COVERAGE CRITERIA. PTNT AGREEABLE TO [facility]. PTNT GIVEN RESIDENTIAL BROCHURE.  RESIDENTIAL WITH PROVIDE SN/PT ON DISC

## 2018-11-01 NOTE — CM/SW NOTE
11/01/18 1000   CM/SW Referral Data   Referral Source Physician   Reason for Referral Discharge planning   Informant Patient;Edward Staff   Pertinent Medical Hx   Primary Care Physician Name J.  [facility],    Patient Info   Patient's Mental Status Alert;O

## 2018-11-01 NOTE — PROGRESS NOTES
11/01/18 1053   Clinical Encounter Type   Visited With Patient and family together   Mu-ism Encounters   Spiritual Requests During Visit / Hospitalization Ricki [phone] of Sick-Anointing Anointed   The patien

## 2018-11-01 NOTE — PROGRESS NOTES
[address] Patient Status:  Inpatient    1955 MRN [id number]   [facility] 3SW-A Attending Vamshi Foss MD   [facility] Day # 1 PCP Yemi Galdamez DO     Subjective:  S/P RIGHT Total Knee Arthroplasty  Systemic or Speci

## 2018-11-01 NOTE — OCCUPATIONAL THERAPY NOTE
OCCUPATIONAL THERAPY QUICK EVALUATION - INPATIENT    Room Number: 361/361-A  Evaluation Date: 11/1/2018     Type of Evaluation: Quick Eval  Presenting Problem: (R TKR)    Physician Order: IP Consult to Occupational Therapy  Reason for Therapy:  ADL/IADL Dy home\"    Patient self-stated goal is to go home when ready    OBJECTIVE  Precautions: (Surgical)  Fall Risk: High fall risk    WEIGHT BEARING RESTRICTION  Weight Bearing Restriction: R lower extremity        R Lower Extremity: Weight Bearing as Tolerated techniques and reminded to have supervision for initial shower at home for safety. Understanding was voiced. Patient End of Session: Up in chair;Needs met;Call light within reach;RN aware of session/findings; All patient questions and concerns addres from Occupational Therapy services. Please re-order if a new functional limitation presents during this admission.     Patient was able to achieve the following goals:  Patient able to toilet transfer: safely with supervision/RW  Patient able to dress lowe

## 2018-11-01 NOTE — PROGRESS NOTES
[facility] Hospitalist Progress Note                                                                   Nancy [address]  6/9/1955    CC: FU post op    Interval History:  - Doing well    Current Meds:  Sc organomegaly, bowel sounds present  Ext: No cyanosis, clubbing, or edema  Skin: Skin warm and dry. No rashes or lesions.   Psych: AAO x 3, with appropriate affect    Pertinent Labs:   Recent Labs   Lab  11/01/18   0451   RBC  3.76*   HGB  10.6*   HCT  33.6*

## 2018-11-01 NOTE — PHYSICAL THERAPY NOTE
PHYSICAL THERAPY KNEE TREATMENT NOTE - INPATIENT     Room Number: 361/361-A     Session: 1&2   Number of Visits to Meet Established Goals: 3    Presenting Problem: S/p Cemented  Right TKA on 10/31/18    Problem List  Active Problems:    Primary osteoarthr Poor    ACTIVITY TOLERANCE                         O2 WALK                  AM-PAC '6-Clicks' INPATIENT SHORT FORM - BASIC MOBILITY  How much difficulty does the patient currently have. ..  -   Turning over in bed (including adjusting bedclothes, sheets and safety. During both sessions, pt was educated on keeping towel roll under ankle when in chair/bed to promote knee ext.      Exercises AM Session PM Session   Ankle Pumps 10 reps 15 reps   Quad Sets 10 reps 15 reps   Glut Sets 10 reps 15 reps   Hip Abd/Add GOALS    Goal #1     Patient is able to demonstrate supine - sit EOB @ level: supervision    Goal #2     Patient is able to demonstrate transfers Sit to/from Stand at assistance level: supervison    Goal #3     Patient is able to ambulate 300 feet with ass

## 2018-11-01 NOTE — PLAN OF CARE
Problem: Impaired Activities of Daily Living  Goal: Achieve highest/safest level of independence in self care  Interventions:  - Assess ability and encourage patient to participate in ADLs to maximize function  - Promote sitting position while performing A Ongoing requirement of a vasopressor  Recent PCI with CANDIDO 4 weeks ago making it a very high risk for periop MI

## 2018-11-02 ENCOUNTER — APPOINTMENT (OUTPATIENT)
Dept: ULTRASOUND IMAGING | Facility: HOSPITAL | Age: 63
DRG: 470 | End: 2018-11-02
Attending: HOSPITALIST
Payer: MEDICARE

## 2018-11-02 PROCEDURE — 85027 COMPLETE CBC AUTOMATED: CPT | Performed by: ORTHOPAEDIC SURGERY

## 2018-11-02 PROCEDURE — 82962 GLUCOSE BLOOD TEST: CPT

## 2018-11-02 PROCEDURE — 93971 EXTREMITY STUDY: CPT | Performed by: HOSPITALIST

## 2018-11-02 PROCEDURE — 97116 GAIT TRAINING THERAPY: CPT

## 2018-11-02 PROCEDURE — 97150 GROUP THERAPEUTIC PROCEDURES: CPT

## 2018-11-02 RX ORDER — PSEUDOEPHEDRINE HCL 30 MG
100 TABLET ORAL 2 TIMES DAILY
Qty: 60 CAPSULE | Refills: 0 | Status: SHIPPED | OUTPATIENT
Start: 2018-11-02 | End: 2018-11-12

## 2018-11-02 RX ORDER — MELATONIN
325
Qty: 30 TABLET | Refills: 0 | Status: SHIPPED | OUTPATIENT
Start: 2018-11-03 | End: 2019-03-07

## 2018-11-02 NOTE — PROGRESS NOTES
Received call for Physical therapy that pt c/o calf pain in LEFT leg. POD # 2 of RIGHT TKA. Page sent to Dr Red Sanchez to see if she wanted to order an US or not. Awaiting response.

## 2018-11-02 NOTE — PROGRESS NOTES
[address] Patient Status:  Inpatient    1955 MRN [id number]   [facility] 3SW-A Attending James Bravo MD   [facility] Day # 2 PCP Susan Smith DO     Subjective:  S/P RIGHT Total Knee Arthroplasty  Systemic or Speci

## 2018-11-02 NOTE — PROGRESS NOTES
Acute Pain Service    Post Op Day 2 Ortho Note    Assessed patient in gym - awake and alert. She is complaining of left calf pain. States left knee pain is ok at rest but still severe with movement.  Still requiring O2 via NC. RN notifying Hospitalist - josue

## 2018-11-02 NOTE — PROGRESS NOTES
[facility] Hospitalist Progress Note                                                                   [address]  6/9/1955    CC: FU post op    Interval History:  - Complaining of pain in the left Labs   Lab  11/01/18   0451  11/02/18   0457   RBC  3.76*  3.57*   HGB  10.6*  9.8*   HCT  33.6*  31.5*   MCV  89.4  88.2   MCH  28.2  27.5   MCHC  31.5  31.1   RDW  14.0  13.8   WBC  8.7  9.0   PLT  230.0  228.0         Assessment/Plan:  Patient is a 70 y

## 2018-11-02 NOTE — PHYSICAL THERAPY NOTE
PHYSICAL THERAPY KNEE TREATMENT NOTE - INPATIENT     Room Number: 290/647-U     Session: 3   Number of Visits to Meet Established Goals: 3    Presenting Problem: S/p Cemented  Right TKA on 10/31/18    Problem List  Active Problems:    Primary osteoarthrit air  HR 89  O2 42%  AM-PAC '6-Clicks' INPATIENT SHORT FORM - BASIC MOBILITY  How much difficulty does the patient currently have. ..  -   Turning over in bed (including adjusting bedclothes, sheets and blankets)?: None   -   Sitting down on and standing up Flexion (degrees): 89     R Knee Extension (degrees): 0       Patient End of Session: Up in chair;Needs met;Call light within reach;RN aware of session/findings; All patient questions and concerns addressed;SCDs in place; Ice applied; Family present; Discussed

## 2018-11-03 ENCOUNTER — APPOINTMENT (OUTPATIENT)
Dept: GENERAL RADIOLOGY | Facility: HOSPITAL | Age: 63
DRG: 470 | End: 2018-11-03
Attending: HOSPITALIST
Payer: MEDICARE

## 2018-11-03 PROCEDURE — 97116 GAIT TRAINING THERAPY: CPT

## 2018-11-03 PROCEDURE — 82962 GLUCOSE BLOOD TEST: CPT

## 2018-11-03 PROCEDURE — 85027 COMPLETE CBC AUTOMATED: CPT | Performed by: ORTHOPAEDIC SURGERY

## 2018-11-03 PROCEDURE — 97110 THERAPEUTIC EXERCISES: CPT

## 2018-11-03 PROCEDURE — 71045 X-RAY EXAM CHEST 1 VIEW: CPT | Performed by: HOSPITALIST

## 2018-11-03 RX ORDER — TRAMADOL HYDROCHLORIDE 50 MG/1
50 TABLET ORAL EVERY 8 HOURS PRN
Qty: 15 TABLET | Refills: 0 | Status: SHIPPED | OUTPATIENT
Start: 2018-11-03 | End: 2018-11-15

## 2018-11-03 RX ORDER — FUROSEMIDE 10 MG/ML
20 INJECTION INTRAMUSCULAR; INTRAVENOUS ONCE
Status: COMPLETED | OUTPATIENT
Start: 2018-11-04 | End: 2018-11-04

## 2018-11-03 RX ORDER — FUROSEMIDE 10 MG/ML
20 INJECTION INTRAMUSCULAR; INTRAVENOUS ONCE
Status: COMPLETED | OUTPATIENT
Start: 2018-11-03 | End: 2018-11-03

## 2018-11-03 NOTE — PLAN OF CARE
RESPIRATORY - ADULT    • Achieves optimal ventilation and oxygenation Not Progressing          Impaired Activities of Daily Living    • Achieve highest/safest level of independence in self care Progressing        Impaired Functional Mobility    • Achieve h

## 2018-11-03 NOTE — PROGRESS NOTES
[facility] Hospitalist Progress Note                                                                   Nancy [address]  6/9/1955    CC: FU post op    Interval History:  - Still desating in 80's with act 0524   RBC  3.76*  3.57*  3.56*   HGB  10.6*  9.8*  9.9*   HCT  33.6*  31.5*  31.5*   MCV  89.4  88.2  88.5   MCH  28.2  27.5  27.8   MCHC  31.5  31.1  31.4   RDW  14.0  13.8  13.6   WBC  8.7  9.0  10.8   PLT  230.0  228.0  265.0         Assessment/Plan:

## 2018-11-03 NOTE — PLAN OF CARE
Home O2 Eval  Sitting on RA = 90%  Walking on RA = 86-89%  Walking with 2L O2 via NC = 89-92%  Sitting after walk with 2L O2 via NC = 96%

## 2018-11-03 NOTE — PROGRESS NOTES
[facility]  Progress Note    Manuela Wright Patient Status:  Inpatient    1955 MRN [id number]   [facility] 3SW-A Attending Santo Long MD   Hosp Day # 3 PCP Alma Centeno DO     SUBJECTIVE:  INTERVAL HISTORY: S/P  3  Procedure(s Harry Miramontes MD in 2 weeks    Ruth Freed NP  11/3/2018  12:04 PM

## 2018-11-03 NOTE — PHYSICAL THERAPY NOTE
PHYSICAL THERAPY KNEE TREATMENT NOTE - INPATIENT     Room Number: 730/711-L     Session: 4   Number of Visits to Meet Established Goals: 3     Presenting Problem: S/p Cemented  Right TKA on 10/31/18    Problem List  Active Problems:    Primary osteoarthri flow (liters per minute): 2      AM-PAC '6-Clicks' INPATIENT SHORT FORM - BASIC MOBILITY  How much difficulty does the patient currently have. ..  -   Turning over in bed (including adjusting bedclothes, sheets and blankets)?: None   -   Sitting down on and Patient End of Session: Up in chair;Needs met;Call light within reach;RN aware of session/findings; All patient questions and concerns addressed;SCDs in place; Ice applied; Family present    ASSESSMENT   Pt continues to make good progress with therapy.  Co

## 2018-11-03 NOTE — CM/SW NOTE
HERLINDA called RN to determine need for home oxygen. Per RN, MD is giving Lasix currently. Home Oxygen needs pending.      HERLINDA/FORREST to follow ~ Pravin Noe, 11/03/18, 1:55 PM

## 2018-11-04 VITALS
BODY MASS INDEX: 43.47 KG/M2 | WEIGHT: 254.63 LBS | HEART RATE: 96 BPM | OXYGEN SATURATION: 97 % | HEIGHT: 64 IN | RESPIRATION RATE: 18 BRPM | SYSTOLIC BLOOD PRESSURE: 132 MMHG | TEMPERATURE: 99 F | DIASTOLIC BLOOD PRESSURE: 80 MMHG

## 2018-11-04 PROCEDURE — 97116 GAIT TRAINING THERAPY: CPT

## 2018-11-04 PROCEDURE — 80048 BASIC METABOLIC PNL TOTAL CA: CPT | Performed by: HOSPITALIST

## 2018-11-04 PROCEDURE — 97150 GROUP THERAPEUTIC PROCEDURES: CPT

## 2018-11-04 PROCEDURE — 85027 COMPLETE CBC AUTOMATED: CPT | Performed by: HOSPITALIST

## 2018-11-04 PROCEDURE — 82962 GLUCOSE BLOOD TEST: CPT

## 2018-11-04 NOTE — PLAN OF CARE
Pt discharge education completed with pt and spouse. All questions addressed. Scripts for Tramadol, Zofran, and Eliquis given to pt. All pt belongings packed and sent with pt. Discharged home with HHPT via personal car.

## 2018-11-04 NOTE — CM/SW NOTE
Call from RN re: likely need for home O2. Discussed that MD order will need to include -home O2, liter flow via nasal canula, portable/concentrator, frequency- continuous or with activity, duration of need, diagnosis, and MD NPI #.     Walk test is good fo

## 2018-11-04 NOTE — RESPIRATORY THERAPY NOTE
Called to assess pt need for cpap/PREETI. Pt fast asleep satting 95% on 1LNC. Assessed for PREETI previously this morning showing low PREETI risk. Pt has clear BS, no snoring, and no desaturations recorded this evening. Will continue to monitor.

## 2018-11-04 NOTE — CM/SW NOTE
11/04/18 1500   Discharge disposition   Expected discharge disposition Home-Health   Name of Facillity/Home Care/Hospice Residential   Discharge transportation Private car

## 2018-11-04 NOTE — PROGRESS NOTES
[facility]  Discharge Summary  Admission 10/31/18, Discharge 18       Chiara Warner Patient Status:  Inpatient    1955 MRN [id number]   [facility] 3SW-A Attending Renee Arias MD   [facility] Day # 4 PCP Teresita Zacarias DO     SUBJ with sleep  5. Continue medical management  6.  Follow up in office with Jorge A Willingham MD in 2 weeks    Rima Delvalle NP  11/4/2018  6:07 AM

## 2018-11-04 NOTE — PHYSICAL THERAPY NOTE
PHYSICAL THERAPY KNEE TREATMENT NOTE - INPATIENT     Room Number: 304/241-Y     Session: 5  Number of Visits to Meet Established Goals: 3     Presenting Problem: S/p Cemented  Right TKA on 10/31/18    Problem List  Active Problems:    Primary osteoarthrit '6-Clicks' INPATIENT SHORT FORM - BASIC MOBILITY  How much difficulty does the patient currently have. ..  -   Turning over in bed (including adjusting bedclothes, sheets and blankets)?: None   -   Sitting down on and standing up from a chair with arms (e.g Knee Extension (degrees): 2       Patient End of Session: Up in chair;Needs met;Call light within reach;RN aware of session/findings; All patient questions and concerns addressed;SCDs in place; Ice applied; Family present    ASSESSMENT   Pt on RA this session

## 2018-11-04 NOTE — PROGRESS NOTES
[facility] Hospitalist Progress Note                                                                   Nancy [address]  6/9/1955    CC: FU post op    Interval History:  -     When awake and upright 02 s 11/02/18   0457  11/03/18   0524  11/04/18   0504   RBC  3.57*  3.56*  3.60*   HGB  9.8*  9.9*  10.4*   HCT  31.5*  31.5*  32.0*   MCV  88.2  88.5  88.9   MCH  27.5  27.8  28.9   MCHC  31.1  31.4  32.5   RDW  13.8  13.6  13.9   WBC  9.0  10.8  11.1   PLT

## 2018-11-09 ENCOUNTER — OFFICE VISIT (OUTPATIENT)
Dept: FAMILY MEDICINE CLINIC | Facility: CLINIC | Age: 63
End: 2018-11-09
Payer: MEDICARE

## 2018-11-09 VITALS
OXYGEN SATURATION: 100 % | HEART RATE: 96 BPM | DIASTOLIC BLOOD PRESSURE: 74 MMHG | WEIGHT: 254 LBS | TEMPERATURE: 98 F | SYSTOLIC BLOOD PRESSURE: 118 MMHG | BODY MASS INDEX: 44 KG/M2

## 2018-11-09 DIAGNOSIS — R39.9 UTI SYMPTOMS: Primary | ICD-10-CM

## 2018-11-09 PROCEDURE — 87186 SC STD MICRODIL/AGAR DIL: CPT | Performed by: NURSE PRACTITIONER

## 2018-11-09 PROCEDURE — 99213 OFFICE O/P EST LOW 20 MIN: CPT | Performed by: NURSE PRACTITIONER

## 2018-11-09 PROCEDURE — 87088 URINE BACTERIA CULTURE: CPT | Performed by: NURSE PRACTITIONER

## 2018-11-09 PROCEDURE — 81003 URINALYSIS AUTO W/O SCOPE: CPT | Performed by: NURSE PRACTITIONER

## 2018-11-09 PROCEDURE — 87086 URINE CULTURE/COLONY COUNT: CPT | Performed by: NURSE PRACTITIONER

## 2018-11-09 RX ORDER — NITROFURANTOIN 25; 75 MG/1; MG/1
100 CAPSULE ORAL 2 TIMES DAILY
Qty: 14 CAPSULE | Refills: 0 | Status: SHIPPED | OUTPATIENT
Start: 2018-11-09 | End: 2019-03-07

## 2018-11-09 RX ORDER — PHENAZOPYRIDINE HYDROCHLORIDE 200 MG/1
200 TABLET, FILM COATED ORAL 3 TIMES DAILY PRN
Qty: 10 TABLET | Refills: 0 | Status: SHIPPED | OUTPATIENT
Start: 2018-11-09 | End: 2019-03-07

## 2018-11-09 NOTE — PROGRESS NOTES
HPI:   Patient presents with symptoms of UTI for 1 days. Complaining of urinary frequency, urgency, dysuria. no suprapubic pain. Denies back pain, fever, hematuria. Pt has +history of UTI in past-last 4/2018.       Current Outpatient Medications:  fe mouth daily. Disp:  Rfl:    Biotin 1000 MCG Oral Tab Take 5,000 mcg by mouth daily. Disp:  Rfl:      No current facility-administered medications for this visit.     Past Medical History:   Diagnosis Date   • Anesthesia complication     NAUSEA AND MUCH EM make bacteria less likely to live in the bladder. Consider supplements during illness or regularly if frequent infections occur. · We will send a urine culture and advise you if a change is needed in your antibiotic.  Please follow up with your primary car

## 2018-11-09 NOTE — PATIENT INSTRUCTIONS
· Wipe from front to back after using the bathroom every time. Consider wet wipes for cleaning. · Stay well hydrated, wear cotton underwear   · Cranberry extract may help make bacteria less likely to live in the bladder.  Consider supplements during illnes

## 2018-11-11 ENCOUNTER — TELEPHONE (OUTPATIENT)
Dept: FAMILY MEDICINE CLINIC | Facility: CLINIC | Age: 63
End: 2018-11-11

## 2018-11-11 NOTE — TELEPHONE ENCOUNTER
Phoned pt at [facility] approved number with info regarding urine culture results: + for e-coli.  Educational information given : complete rx as prescribed, remain hydrated with water avoiding carbonated beverages, alcohol and caffeine, Clinic hours and answerin

## 2018-12-12 PROBLEM — Z96.651 STATUS POST TOTAL RIGHT KNEE REPLACEMENT: Status: ACTIVE | Noted: 2018-12-12

## 2018-12-29 ENCOUNTER — OFFICE VISIT (OUTPATIENT)
Dept: FAMILY MEDICINE CLINIC | Facility: CLINIC | Age: 63
End: 2018-12-29
Payer: MEDICARE

## 2018-12-29 VITALS
RESPIRATION RATE: 20 BRPM | DIASTOLIC BLOOD PRESSURE: 80 MMHG | TEMPERATURE: 98 F | OXYGEN SATURATION: 98 % | HEART RATE: 97 BPM | HEIGHT: 64 IN | BODY MASS INDEX: 41.83 KG/M2 | SYSTOLIC BLOOD PRESSURE: 132 MMHG | WEIGHT: 245 LBS

## 2018-12-29 DIAGNOSIS — R30.0 DYSURIA: Primary | ICD-10-CM

## 2018-12-29 PROCEDURE — 87186 SC STD MICRODIL/AGAR DIL: CPT | Performed by: NURSE PRACTITIONER

## 2018-12-29 PROCEDURE — 87088 URINE BACTERIA CULTURE: CPT | Performed by: NURSE PRACTITIONER

## 2018-12-29 PROCEDURE — 87086 URINE CULTURE/COLONY COUNT: CPT | Performed by: NURSE PRACTITIONER

## 2018-12-29 PROCEDURE — 99213 OFFICE O/P EST LOW 20 MIN: CPT | Performed by: NURSE PRACTITIONER

## 2018-12-29 PROCEDURE — 81003 URINALYSIS AUTO W/O SCOPE: CPT | Performed by: NURSE PRACTITIONER

## 2018-12-29 RX ORDER — PHENAZOPYRIDINE HYDROCHLORIDE 200 MG/1
200 TABLET, FILM COATED ORAL 3 TIMES DAILY PRN
Qty: 6 TABLET | Refills: 0 | Status: SHIPPED | OUTPATIENT
Start: 2018-12-29 | End: 2019-03-07

## 2018-12-29 RX ORDER — NITROFURANTOIN 25; 75 MG/1; MG/1
100 CAPSULE ORAL 2 TIMES DAILY
Qty: 14 CAPSULE | Refills: 0 | Status: SHIPPED | OUTPATIENT
Start: 2018-12-29 | End: 2019-01-05

## 2018-12-29 NOTE — PROGRESS NOTES
CHIEF COMPLAINT:   Patient presents with:  Dysuria      HPI:   Jose Villegas is a 70year old female who presents with symptoms of UTI. Complaining of urinary frequency, urgency, dysuria for 2 days. Symptoms have been worsened since onset.   Treatments trie 12 tablet Rfl: 0   Esomeprazole Magnesium 20 MG Oral Capsule Delayed Release Take 20 mg by mouth 3 (three) times a week.  M/W/SAT Disp:  Rfl:    amoxicillin 500 MG Oral Cap 4 po 30min prior to procedure Disp: 4 capsule Rfl: 0   cetirizine 10 MG Oral Tab [facility] URINE Postive Negative    LEUKOCYTES Large Negative    APPEARANCE orange Clear    URINE-COLOR cloudy Yellow    Multistix Lot# [id number] Numeric    Multistix Expiration Date 8/2,019 Date         ASSESSMENT AND PLAN:   Robinson Suero is a 70year old female presen bathing suit for a long time.

## 2019-03-07 PROCEDURE — 88342 IMHCHEM/IMCYTCHM 1ST ANTB: CPT

## 2019-03-20 ENCOUNTER — APPOINTMENT (OUTPATIENT)
Dept: LAB | Age: 64
End: 2019-03-20
Attending: DERMATOLOGY
Payer: MEDICARE

## 2019-03-20 DIAGNOSIS — D48.5 NEOPLASM OF UNCERTAIN BEHAVIOR OF SKIN: ICD-10-CM

## 2019-04-06 ENCOUNTER — OFFICE VISIT (OUTPATIENT)
Dept: FAMILY MEDICINE CLINIC | Facility: CLINIC | Age: 64
End: 2019-04-06
Payer: MEDICARE

## 2019-04-06 VITALS
WEIGHT: 254 LBS | DIASTOLIC BLOOD PRESSURE: 78 MMHG | TEMPERATURE: 98 F | BODY MASS INDEX: 44 KG/M2 | SYSTOLIC BLOOD PRESSURE: 124 MMHG | OXYGEN SATURATION: 98 % | HEART RATE: 88 BPM

## 2019-04-06 DIAGNOSIS — R30.0 DYSURIA: Primary | ICD-10-CM

## 2019-04-06 DIAGNOSIS — R31.0 GROSS HEMATURIA: ICD-10-CM

## 2019-04-06 PROCEDURE — 87186 SC STD MICRODIL/AGAR DIL: CPT | Performed by: NURSE PRACTITIONER

## 2019-04-06 PROCEDURE — 81003 URINALYSIS AUTO W/O SCOPE: CPT | Performed by: NURSE PRACTITIONER

## 2019-04-06 PROCEDURE — 99213 OFFICE O/P EST LOW 20 MIN: CPT | Performed by: NURSE PRACTITIONER

## 2019-04-06 PROCEDURE — 87086 URINE CULTURE/COLONY COUNT: CPT | Performed by: NURSE PRACTITIONER

## 2019-04-06 PROCEDURE — 87077 CULTURE AEROBIC IDENTIFY: CPT | Performed by: NURSE PRACTITIONER

## 2019-04-06 RX ORDER — PHENAZOPYRIDINE HYDROCHLORIDE 200 MG/1
200 TABLET, FILM COATED ORAL 3 TIMES DAILY PRN
Qty: 6 TABLET | Refills: 0 | Status: SHIPPED | OUTPATIENT
Start: 2019-04-06 | End: 2019-08-27

## 2019-04-06 RX ORDER — NITROFURANTOIN 25; 75 MG/1; MG/1
100 CAPSULE ORAL 2 TIMES DAILY
Qty: 14 CAPSULE | Refills: 0 | Status: SHIPPED | OUTPATIENT
Start: 2019-04-06 | End: 2019-08-27 | Stop reason: ALTCHOICE

## 2019-04-06 NOTE — PATIENT INSTRUCTIONS
-Take antibiotics with food and plenty of water. Eat yogurt or take probiotic daily.   Boom Cony is a good otc probiotic.    -increase water intake  -follow up with PCP or gyne regarding increased frequently of UTIs  -we will send out urine culture and call y bowels. The bacteria get onto the skin around the opening of the urethra. From there, they can get into the urine and travel up to the bladder, causing inflammation and infection. This usually happens because of:  · Wiping improperly after urinating.  Alway loose-fitting clothes and cotton underwear. Avoid tight-fitting pants. · Improve your diet and prevent constipation. Eat more fresh fruit and vegetables, and fiber, and less junk and fatty foods. · Avoid sex until your symptoms are gone.   · Avoid caffein

## 2019-04-06 NOTE — PROGRESS NOTES
Amina Washington is a 70year old female. HPI:   Patient presents with symptoms of UTI that started this morning. Complaining of dysuria. Also noted blood when wiping this morning. Currently has a cold. Reports she doesn't think she drinks enough water.   Holly Blas • Calculus of kidney    • Diabetes ([facility]    • Esophageal reflux    • High blood pressure    • High cholesterol    • OBESITY    • Osteoarthritis    • OTHER DISEASES Pituitary Adenoma   • PONV (postoperative nausea and vomiting)    • Visual impairment     gl Will start on abx and send culture. Discussed possible causes with pt for frequent UTI's. States she knows her estrogen levels are lower. She is has type 2 DM. Advised she follow up with PCP or gyne regarding frequency of UTI's.  She is understanding of thi · Unable to hold urine in (urinary incontinence)  · Fever  · Loss of appetite  · Confusion (in older adults)  Causes  Bladder infections are not contagious. You can't get one from someone else, from a toilet seat, or from sharing a bath.   The most common c · Drink plenty of fluids to prevent dehydration and flush out your bladder. Do this unless you must restrict fluids for other health reasons, or your doctor told you not to. · Proper cleaning after going to the bathroom is important.  Wipe from front to ba © 5347-7781 [employer] [address]. [address] [facility], [address] [place]. All rights reserved. This information is not intended as a substitute for professional medical care. Always follow your healthcare professional's instructions.           The p

## 2019-04-09 ENCOUNTER — TELEPHONE (OUTPATIENT)
Dept: FAMILY MEDICINE CLINIC | Facility: CLINIC | Age: 64
End: 2019-04-09

## 2019-04-09 NOTE — TELEPHONE ENCOUNTER
Called concerning urine culture >100,000 E. Coli and sensitive to Macrobid (prescribed). Relates that she's feeling much better.

## 2019-08-18 ENCOUNTER — DIAGNOSTIC TRANS (OUTPATIENT)
Dept: OTHER | Age: 64
End: 2019-08-18

## 2019-08-18 ENCOUNTER — HOSPITAL (OUTPATIENT)
Dept: OTHER | Age: 64
End: 2019-08-18

## 2019-08-18 LAB
ANALYZER ANC (IANC): NORMAL
ANION GAP SERPL CALC-SCNC: 12 MMOL/L (ref 10–20)
BASOPHILS # BLD: 0.1 K/MCL (ref 0–0.3)
BASOPHILS NFR BLD: 1 %
BUN SERPL-MCNC: 15 MG/DL (ref 6–20)
BUN/CREAT SERPL: 23 (ref 7–25)
CALCIUM SERPL-MCNC: 9.1 MG/DL (ref 8.4–10.2)
CHLORIDE SERPL-SCNC: 104 MMOL/L (ref 98–107)
CO2 SERPL-SCNC: 28 MMOL/L (ref 21–32)
CREAT SERPL-MCNC: 0.64 MG/DL (ref 0.51–0.95)
D DIMER PPP FEU-MCNC: 0.82 MG/L (FEU)
D DIMER PPP FEU-MCNC: ABNORMAL
DIFFERENTIAL METHOD BLD: NORMAL
EOSINOPHIL # BLD: 0.1 K/MCL (ref 0.1–0.5)
EOSINOPHIL NFR BLD: 2 %
ERYTHROCYTE [DISTWIDTH] IN BLOOD: 14.2 % (ref 11–15)
GLUCOSE SERPL-MCNC: 114 MG/DL (ref 65–99)
HCT VFR BLD CALC: 40.2 % (ref 36–46.5)
HGB BLD-MCNC: 13 G/DL (ref 12–15.5)
IMM GRANULOCYTES # BLD AUTO: 0 K/MCL (ref 0–0.2)
IMM GRANULOCYTES NFR BLD: 1 %
LYMPHOCYTES # BLD: 1.5 K/MCL (ref 1–4)
LYMPHOCYTES NFR BLD: 18 %
MCH RBC QN AUTO: 27.7 PG (ref 26–34)
MCHC RBC AUTO-ENTMCNC: 32.3 G/DL (ref 32–36.5)
MCV RBC AUTO: 85.7 FL (ref 78–100)
MONOCYTES # BLD: 0.6 K/MCL (ref 0.3–0.9)
MONOCYTES NFR BLD: 8 %
NEUTROPHILS # BLD: 5.8 K/MCL (ref 1.8–7.7)
NEUTROPHILS NFR BLD: 70 %
NEUTS SEG NFR BLD: NORMAL %
NRBC (NRBCRE): 0 /100 WBC
NT-PROBNP SERPL-MCNC: 37 PG/ML
PLATELET # BLD: 297 K/MCL (ref 140–450)
POTASSIUM SERPL-SCNC: 3.8 MMOL/L (ref 3.4–5.1)
RBC # BLD: 4.69 MIL/MCL (ref 4–5.2)
SODIUM SERPL-SCNC: 140 MMOL/L (ref 135–145)
TROPONIN I SERPL HS-MCNC: <0.02 NG/ML
WBC # BLD: 8.1 K/MCL (ref 4.2–11)

## 2019-08-19 ENCOUNTER — HOSPITAL (OUTPATIENT)
Dept: OTHER | Age: 64
End: 2019-08-19

## 2019-08-19 LAB
ALBUMIN SERPL-MCNC: 3.3 G/DL (ref 3.6–5.1)
ALBUMIN/GLOB SERPL: 0.8 {RATIO} (ref 1–2.4)
ALP SERPL-CCNC: 101 UNITS/L (ref 45–117)
ALT SERPL-CCNC: 28 UNITS/L
ANALYZER ANC (IANC): ABNORMAL
ANION GAP SERPL CALC-SCNC: 11 MMOL/L (ref 10–20)
AST SERPL-CCNC: 13 UNITS/L
BASOPHILS # BLD: 0.1 K/MCL (ref 0–0.3)
BASOPHILS NFR BLD: 1 %
BILIRUB SERPL-MCNC: 0.3 MG/DL (ref 0.2–1)
BUN SERPL-MCNC: 13 MG/DL (ref 6–20)
BUN/CREAT SERPL: 18 (ref 7–25)
CALCIUM SERPL-MCNC: 9.2 MG/DL (ref 8.4–10.2)
CHLORIDE SERPL-SCNC: 103 MMOL/L (ref 98–107)
CO2 SERPL-SCNC: 31 MMOL/L (ref 21–32)
CREAT SERPL-MCNC: 0.73 MG/DL (ref 0.51–0.95)
DIFFERENTIAL METHOD BLD: ABNORMAL
EOSINOPHIL # BLD: 0.2 K/MCL (ref 0.1–0.5)
EOSINOPHIL NFR BLD: 2 %
ERYTHROCYTE [DISTWIDTH] IN BLOOD: 14.3 % (ref 11–15)
GLOBULIN SER-MCNC: 4.3 G/DL (ref 2–4)
GLUCOSE BLDC GLUCOMTR-MCNC: 136 MG/DL (ref 65–99)
GLUCOSE BLDC GLUCOMTR-MCNC: ABNORMAL MG/DL
GLUCOSE SERPL-MCNC: 127 MG/DL (ref 65–99)
HCT VFR BLD CALC: 42.3 % (ref 36–46.5)
HGB BLD-MCNC: 13.5 G/DL (ref 12–15.5)
IMM GRANULOCYTES # BLD AUTO: 0.1 K/MCL (ref 0–0.2)
IMM GRANULOCYTES NFR BLD: 1 %
LYMPHOCYTES # BLD: 2.2 K/MCL (ref 1–4)
LYMPHOCYTES NFR BLD: 23 %
MAGNESIUM SERPL-MCNC: 1.7 MG/DL (ref 1.7–2.4)
MCH RBC QN AUTO: 27.6 PG (ref 26–34)
MCHC RBC AUTO-ENTMCNC: 31.9 G/DL (ref 32–36.5)
MCV RBC AUTO: 86.3 FL (ref 78–100)
MONOCYTES # BLD: 0.7 K/MCL (ref 0.3–0.9)
MONOCYTES NFR BLD: 8 %
NEUTROPHILS # BLD: 6.3 K/MCL (ref 1.8–7.7)
NEUTROPHILS NFR BLD: 65 %
NEUTS SEG NFR BLD: ABNORMAL %
NRBC (NRBCRE): 0 /100 WBC
PLATELET # BLD: 313 K/MCL (ref 140–450)
POTASSIUM SERPL-SCNC: 3.7 MMOL/L (ref 3.4–5.1)
PROT SERPL-MCNC: 7.6 G/DL (ref 6.4–8.2)
RBC # BLD: 4.9 MIL/MCL (ref 4–5.2)
SODIUM SERPL-SCNC: 141 MMOL/L (ref 135–145)
TROPONIN I SERPL HS-MCNC: <0.02 NG/ML
TROPONIN I SERPL HS-MCNC: <0.02 NG/ML
WBC # BLD: 9.5 K/MCL (ref 4.2–11)

## 2019-08-27 PROBLEM — Z96.651 STATUS POST TOTAL RIGHT KNEE REPLACEMENT: Status: RESOLVED | Noted: 2018-12-12 | Resolved: 2019-08-27

## 2019-08-27 PROBLEM — R30.0 DYSURIA: Status: RESOLVED | Noted: 2018-12-29 | Resolved: 2019-08-27

## 2019-08-27 PROBLEM — E66.01 CLASS 3 SEVERE OBESITY DUE TO EXCESS CALORIES WITH SERIOUS COMORBIDITY AND BODY MASS INDEX (BMI) OF 40.0 TO 44.9 IN ADULT (HCC): Status: ACTIVE | Noted: 2019-08-27

## 2019-08-28 PROBLEM — I25.10 CORONARY ARTERY DISEASE INVOLVING NATIVE CORONARY ARTERY OF NATIVE HEART WITHOUT ANGINA PECTORIS: Status: ACTIVE | Noted: 2019-08-28

## 2019-08-28 PROBLEM — I10 ESSENTIAL HYPERTENSION: Status: ACTIVE | Noted: 2019-08-28

## 2019-08-29 PROBLEM — R07.89 ATYPICAL CHEST PAIN: Status: ACTIVE | Noted: 2019-08-29

## 2019-08-29 PROBLEM — R94.31 ABNORMAL EKG: Status: ACTIVE | Noted: 2019-08-29

## 2019-09-27 ENCOUNTER — HOSPITAL (OUTPATIENT)
Dept: OTHER | Age: 64
End: 2019-09-27
Attending: FAMILY MEDICINE

## 2020-06-12 PROBLEM — E66.01 CLASS 3 SEVERE OBESITY DUE TO EXCESS CALORIES WITH SERIOUS COMORBIDITY AND BODY MASS INDEX (BMI) OF 40.0 TO 44.9 IN ADULT (HCC): Status: RESOLVED | Noted: 2019-08-27 | Resolved: 2020-06-12

## 2020-06-12 PROBLEM — E66.01 OBESITY, MORBID (HCC): Status: ACTIVE | Noted: 2020-06-12

## 2020-10-28 PROBLEM — R07.89 ATYPICAL CHEST PAIN: Status: RESOLVED | Noted: 2019-08-29 | Resolved: 2020-10-28

## 2020-10-28 PROBLEM — R94.31 ABNORMAL EKG: Status: RESOLVED | Noted: 2019-08-29 | Resolved: 2020-10-28

## 2021-10-21 NOTE — BRIEF OP NOTE
PTA to right EIA with 7mm x 60mm Jasbir balloon   Pre-Operative Diagnosis: THYROID NODULE      Post-Operative Diagnosis: THYROID NODULE      Procedure Performed:   LEFT THYROID LOBECTOMY WITH INTRAOPERATIVE FROZEN SECTION, BILATERAL NERVE MONITORING    Surgeon(s) and Role:     Gwynne Closs, MD - Pr

## 2024-04-27 ENCOUNTER — APPOINTMENT (OUTPATIENT)
Dept: GENERAL RADIOLOGY | Age: 69
End: 2024-04-27
Attending: EMERGENCY MEDICINE

## 2024-04-27 ENCOUNTER — HOSPITAL ENCOUNTER (OUTPATIENT)
Age: 69
Setting detail: OBSERVATION
Discharge: HOME | End: 2024-04-27
Attending: EMERGENCY MEDICINE | Admitting: HOSPITALIST

## 2024-04-27 ENCOUNTER — APPOINTMENT (OUTPATIENT)
Dept: CARDIOLOGY | Age: 69
End: 2024-04-27
Attending: INTERNAL MEDICINE

## 2024-04-27 VITALS
HEART RATE: 74 BPM | SYSTOLIC BLOOD PRESSURE: 147 MMHG | TEMPERATURE: 97.9 F | RESPIRATION RATE: 11 BRPM | HEIGHT: 64 IN | BODY MASS INDEX: 43.06 KG/M2 | DIASTOLIC BLOOD PRESSURE: 75 MMHG | WEIGHT: 252.21 LBS | OXYGEN SATURATION: 95 %

## 2024-04-27 DIAGNOSIS — R07.9 CHEST PAIN, UNSPECIFIED TYPE: Primary | ICD-10-CM

## 2024-04-27 LAB
ALBUMIN SERPL-MCNC: 3.4 G/DL (ref 3.6–5.1)
ALBUMIN/GLOB SERPL: 0.9 {RATIO} (ref 1–2.4)
ALP SERPL-CCNC: 111 UNITS/L (ref 45–117)
ALT SERPL-CCNC: 46 UNITS/L
ANION GAP SERPL CALC-SCNC: 8 MMOL/L (ref 7–19)
AORTIC VALVE AREA (AVA): 0.72
APTT PPP: 29 SEC (ref 22–32)
ASCENDING AORTA (AAD): 3
AST SERPL-CCNC: 20 UNITS/L
AV MEAN GRADIENT (AVMG): 5
AV MEAN VELOCITY (AVMV): 1.07
AV PEAK GRADIENT (AVPG): 10
AV PEAK VELOCITY (AVPV): 1.55
AV STENOSIS SEVERITY TEXT: NORMAL
AVI LVOT PEAK GRADIENT (LVOTMG): 1
BASOPHILS # BLD: 0.1 K/MCL (ref 0–0.3)
BASOPHILS NFR BLD: 1 %
BILIRUB SERPL-MCNC: 0.1 MG/DL (ref 0.2–1)
BUN SERPL-MCNC: 17 MG/DL (ref 6–20)
BUN/CREAT SERPL: 25 (ref 7–25)
CALCIUM SERPL-MCNC: 9.9 MG/DL (ref 8.4–10.2)
CHLORIDE SERPL-SCNC: 101 MMOL/L (ref 97–110)
CO2 SERPL-SCNC: 32 MMOL/L (ref 21–32)
CREAT SERPL-MCNC: 0.68 MG/DL (ref 0.51–0.95)
D DIMER PPP FEU-MCNC: 0.41 MG/L (FEU)
DEPRECATED RDW RBC: 45.3 FL (ref 39–50)
E WAVE DECELARATION TIME (MDT): 7.1
EGFRCR SERPLBLD CKD-EPI 2021: >90 ML/MIN/{1.73_M2}
EOSINOPHIL # BLD: 0.2 K/MCL (ref 0–0.5)
EOSINOPHIL NFR BLD: 1 %
ERYTHROCYTE [DISTWIDTH] IN BLOOD: 14 % (ref 11–15)
FASTING DURATION TIME PATIENT: ABNORMAL H
FLUAV RNA RESP QL NAA+PROBE: NOT DETECTED
FLUBV RNA RESP QL NAA+PROBE: NOT DETECTED
GLOBULIN SER-MCNC: 4 G/DL (ref 2–4)
GLUCOSE SERPL-MCNC: 218 MG/DL (ref 70–99)
HCT VFR BLD CALC: 40.1 % (ref 36–46.5)
HGB BLD-MCNC: 13 G/DL (ref 12–15.5)
IMM GRANULOCYTES # BLD AUTO: 0.1 K/MCL (ref 0–0.2)
IMM GRANULOCYTES # BLD: 1 %
INR PPP: 1
INTERVENTRICULAR SEPTUM IN END DIASTOLE (IVSD): 2.69
LEFT INTERNAL DIMENSION IN SYSTOLE (LVSD): 1.1
LEFT VENTRICULAR INTERNAL DIMENSION IN DIASTOLE (LVDD): 3.2
LEFT VENTRICULAR POSTERIOR WALL IN END DIASTOLE (LVPW): 5.1
LIPASE SERPL-CCNC: 157 UNITS/L (ref 15–77)
LV EF: NORMAL %
LVOT 2D (LVOTD): 25
LVOT VTI (LVOTVTI): 1.15
LYMPHOCYTES # BLD: 1 K/MCL (ref 1–4)
LYMPHOCYTES NFR BLD: 10 %
MAGNESIUM SERPL-MCNC: 1.9 MG/DL (ref 1.7–2.4)
MCH RBC QN AUTO: 29.1 PG (ref 26–34)
MCHC RBC AUTO-ENTMCNC: 32.4 G/DL (ref 32–36.5)
MCV RBC AUTO: 89.7 FL (ref 78–100)
MONOCYTES # BLD: 0.7 K/MCL (ref 0.3–0.9)
MONOCYTES NFR BLD: 7 %
MV E TISSUE VEL MED (MESV): 8.27
MV E WAVE VEL/E TISSUE VEL MED(MSR): 10.2
MV PEAK A VELOCITY (MVPAV): 212
MV PEAK E VELOCITY (MVPEV): 0.81
NEUTROPHILS # BLD: 8.6 K/MCL (ref 1.8–7.7)
NEUTROPHILS NFR BLD: 80 %
NRBC BLD MANUAL-RTO: 0 /100 WBC
NT-PROBNP SERPL-MCNC: 71 PG/ML
PLATELET # BLD AUTO: 299 K/MCL (ref 140–450)
POTASSIUM SERPL-SCNC: 3.7 MMOL/L (ref 3.4–5.1)
PROT SERPL-MCNC: 7.4 G/DL (ref 6.4–8.2)
PROTHROMBIN TIME: 10.2 SEC (ref 9.7–11.8)
RAINBOW EXTRA TUBES HOLD SPECIMEN: NORMAL
RBC # BLD: 4.47 MIL/MCL (ref 4–5.2)
RSV AG NPH QL IA.RAPID: NOT DETECTED
RV END SYSTOLIC LONGITUDINAL STRAIN FREE WALL (RVGS): 1.7
SARS-COV-2 RNA RESP QL NAA+PROBE: NOT DETECTED
SERVICE CMNT-IMP: NORMAL
SERVICE CMNT-IMP: NORMAL
SODIUM SERPL-SCNC: 137 MMOL/L (ref 135–145)
TRICUSPID VALVE PEAK REGURGITATION VELOCITY (TRPV): 3.4
TROPONIN I SERPL DL<=0.01 NG/ML-MCNC: <4 NG/L
TROPONIN I SERPL DL<=0.01 NG/ML-MCNC: <4 NG/L
WBC # BLD: 10.7 K/MCL (ref 4.2–11)

## 2024-04-27 PROCEDURE — 10004651 HB RX, NO CHARGE ITEM: Performed by: EMERGENCY MEDICINE

## 2024-04-27 PROCEDURE — 85379 FIBRIN DEGRADATION QUANT: CPT | Performed by: EMERGENCY MEDICINE

## 2024-04-27 PROCEDURE — 85730 THROMBOPLASTIN TIME PARTIAL: CPT | Performed by: EMERGENCY MEDICINE

## 2024-04-27 PROCEDURE — G0378 HOSPITAL OBSERVATION PER HR: HCPCS

## 2024-04-27 PROCEDURE — 83735 ASSAY OF MAGNESIUM: CPT | Performed by: EMERGENCY MEDICINE

## 2024-04-27 PROCEDURE — 93005 ELECTROCARDIOGRAM TRACING: CPT | Performed by: EMERGENCY MEDICINE

## 2024-04-27 PROCEDURE — 84484 ASSAY OF TROPONIN QUANT: CPT | Performed by: EMERGENCY MEDICINE

## 2024-04-27 PROCEDURE — 93306 TTE W/DOPPLER COMPLETE: CPT

## 2024-04-27 PROCEDURE — 36415 COLL VENOUS BLD VENIPUNCTURE: CPT | Performed by: EMERGENCY MEDICINE

## 2024-04-27 PROCEDURE — 99285 EMERGENCY DEPT VISIT HI MDM: CPT

## 2024-04-27 PROCEDURE — 83880 ASSAY OF NATRIURETIC PEPTIDE: CPT | Performed by: EMERGENCY MEDICINE

## 2024-04-27 PROCEDURE — 85025 COMPLETE CBC W/AUTO DIFF WBC: CPT | Performed by: EMERGENCY MEDICINE

## 2024-04-27 PROCEDURE — 0241U COVID/FLU/RSV PANEL: CPT | Performed by: EMERGENCY MEDICINE

## 2024-04-27 PROCEDURE — 93010 ELECTROCARDIOGRAM REPORT: CPT | Performed by: INTERNAL MEDICINE

## 2024-04-27 PROCEDURE — 83690 ASSAY OF LIPASE: CPT | Performed by: EMERGENCY MEDICINE

## 2024-04-27 PROCEDURE — 85610 PROTHROMBIN TIME: CPT | Performed by: EMERGENCY MEDICINE

## 2024-04-27 PROCEDURE — 71045 X-RAY EXAM CHEST 1 VIEW: CPT

## 2024-04-27 PROCEDURE — 80053 COMPREHEN METABOLIC PANEL: CPT | Performed by: EMERGENCY MEDICINE

## 2024-04-27 RX ORDER — POLYETHYLENE GLYCOL 3350 17 G/17G
17 POWDER, FOR SOLUTION ORAL DAILY
Status: DISCONTINUED | OUTPATIENT
Start: 2024-04-27 | End: 2024-04-27 | Stop reason: HOSPADM

## 2024-04-27 RX ORDER — ONDANSETRON 2 MG/ML
4 INJECTION INTRAMUSCULAR; INTRAVENOUS EVERY 6 HOURS PRN
Status: DISCONTINUED | OUTPATIENT
Start: 2024-04-27 | End: 2024-04-27 | Stop reason: HOSPADM

## 2024-04-27 RX ORDER — ASPIRIN 81 MG/1
243 TABLET, CHEWABLE ORAL ONCE
Status: COMPLETED | OUTPATIENT
Start: 2024-04-27 | End: 2024-04-27

## 2024-04-27 RX ORDER — ACETAMINOPHEN 325 MG/1
650 TABLET ORAL EVERY 4 HOURS PRN
Status: DISCONTINUED | OUTPATIENT
Start: 2024-04-27 | End: 2024-04-27 | Stop reason: HOSPADM

## 2024-04-27 RX ADMIN — ASPIRIN 81 MG 243 MG: 81 TABLET ORAL at 06:27

## 2024-04-27 ASSESSMENT — HEART SCORE
HISTORY: SLIGHTLY SUSPICIOUS
HEART SCORE: 5
TROPONIN: EQUAL OR LESS THAN NORMAL LIMIT
AGE: EQUAL OR GREATER THAN 65
RISK FACTORS: EQUAL OR GREATER  THAN 3 RISK FACTORS OR HISTORY OF ATHEROSCLEROTIC DISEASE
EKG: NON SPECIFIC REPOLARIZATION DISTURBANCE

## 2024-04-27 ASSESSMENT — PAIN SCALES - GENERAL: PAINLEVEL_OUTOF10: 3

## 2024-04-28 LAB
ATRIAL RATE (BPM): 77
ATRIAL RATE (BPM): 80
P AXIS (DEGREES): 12
P AXIS (DEGREES): 15
PR-INTERVAL (MSEC): 140
PR-INTERVAL (MSEC): 146
QRS-INTERVAL (MSEC): 80
QRS-INTERVAL (MSEC): 84
QT-INTERVAL (MSEC): 378
QT-INTERVAL (MSEC): 380
QTC: 428
QTC: 438
R AXIS (DEGREES): -4
R AXIS (DEGREES): -4
REPORT TEXT: NORMAL
REPORT TEXT: NORMAL
T AXIS (DEGREES): 22
T AXIS (DEGREES): 36
VENTRICULAR RATE EKG/MIN (BPM): 77
VENTRICULAR RATE EKG/MIN (BPM): 80

## 2024-05-28 ENCOUNTER — TELEPHONE (OUTPATIENT)
Dept: CARE COORDINATION | Age: 69
End: 2024-05-28

## (undated) DEVICE — WRAP COOLING KNEE W/ICE PILLOW

## (undated) DEVICE — PROBE 8225101 5PK STD PRASS FL TIP ROHS

## (undated) DEVICE — HOOD, PEEL-AWAY: Brand: FLYTE

## (undated) DEVICE — SUTURE MONOCRYL 4-0 PS-2

## (undated) DEVICE — ABDOMINAL PAD: Brand: DERMACEA

## (undated) DEVICE — REM POLYHESIVE ADULT PATIENT RETURN ELECTRODE: Brand: VALLEYLAB

## (undated) DEVICE — DRAPE,U/SHT,SPLIT,FILM,60X84,STERILE: Brand: MEDLINE

## (undated) DEVICE — CAUTERY NEEDLE 2IN INS E1465

## (undated) DEVICE — Device: Brand: STABLECUT®

## (undated) DEVICE — DECANTER BAG 9": Brand: MEDLINE INDUSTRIES, INC.

## (undated) DEVICE — SPONGE: SPECIALTY PEANUT XR 100/CS: Brand: MEDICAL ACTION INDUSTRIES

## (undated) DEVICE — DRESSING AQUACEL AG 3.5X12

## (undated) DEVICE — 3M(TM) MICROPORE TAPE DISPENSER 1535-2: Brand: 3M™ MICROPORE™

## (undated) DEVICE — UNIVERSAL STERIBUMP® STERILE (5/CASE): Brand: UNIVERSAL STERIBUMP®

## (undated) DEVICE — SUTURE VICRYL 3-0 SH

## (undated) DEVICE — 450 ML BOTTLE OF 0.05% CHLORHEXIDINE GLUCONATE IN 99.95% STERILE WATER FOR IRRIGATION, USP AND APPLICATOR.: Brand: IRRISEPT ANTIMICROBIAL WOUND LAVAGE

## (undated) DEVICE — SOL  .9 1000ML BTL

## (undated) DEVICE — CHLORAPREP ORANGE TINT 10.5ML

## (undated) DEVICE — HARMONIC HD 1000I SHEARS, 20CM SHAFT LENGTH: Brand: HARMONIC

## (undated) DEVICE — 2T11 #2 PDO 36 X 36: Brand: 2T11 #2 PDO 36 X 36

## (undated) DEVICE — AIRWAY ENDO  TRIVANTAGE 7MM

## (undated) DEVICE — STERILE POLYISOPRENE POWDER-FREE SURGICAL GLOVES: Brand: PROTEXIS

## (undated) DEVICE — GLOVE BIOGEL M SURG SZ 6-1/2

## (undated) DEVICE — SUTURE VICRYL 2-0 CP-1

## (undated) DEVICE — BIPOLAR SEALER 23-112-1 AQM 6.0: Brand: AQUAMANTYS™

## (undated) DEVICE — CHLORAPREP 26ML APPLICATOR

## (undated) DEVICE — INSTRUMENT FEE

## (undated) DEVICE — ZIMMER® STERILE DISPOSABLE TOURNIQUET CUFF WITH PLC, DUAL PORT, SINGLE BLADDER, 34 IN. (86 CM)

## (undated) DEVICE — TOTAL KNEE CDS: Brand: MEDLINE INDUSTRIES, INC.

## (undated) DEVICE — ATTUNE KNEE SYSTEM TIBIAL INSERT ROTATING PLATFORM POSTERIOR STABILIZED 4 7MM AOX
Type: IMPLANTABLE DEVICE | Site: KNEE | Status: NON-FUNCTIONAL
Brand: ATTUNE
Removed: 2018-10-31

## (undated) DEVICE — CONVERTORS STOCKINETTE: Brand: CONVERTORS

## (undated) DEVICE — GOWN,SIRUS,FABRIC-REINFORCED,LARGE: Brand: MEDLINE

## (undated) DEVICE — SUTURE SILK 2-0

## (undated) DEVICE — BANDAGE ELASTIC ACE 6\" X-LONG

## (undated) DEVICE — GAUZE SPONGES,USP TYPE VII GAUZE, 12 PLY: Brand: CURITY

## (undated) DEVICE — LIGACLIP EXTRA LIGATING CLIP CARTRIDGES: 6 TITANIUM CLIPS/ CARTRIDGE (SMALL): Brand: LIGACLIP

## (undated) DEVICE — GOWN SURG AERO CHROME XXL

## (undated) DEVICE — SPECIMEN CONTAINER,POSITIVE SEAL INDICATOR, OR PACKAGED: Brand: PRECISION

## (undated) DEVICE — KENDALL SCD EXPRESS SLEEVES, KNEE LENGTH, MEDIUM: Brand: KENDALL SCD

## (undated) DEVICE — MINI LAP PACK-LF: Brand: MEDLINE INDUSTRIES, INC.

## (undated) DEVICE — 3M™ STERI-STRIP™ REINFORCED ADHESIVE SKIN CLOSURES, R1547, 1/2 IN X 4 IN (12 MM X 100 MM), 6 STRIPS/ENVELOPE: Brand: 3M™ STERI-STRIP™

## (undated) DEVICE — BOWL CEMENT MIX QUICK-VAC

## (undated) DEVICE — ABSORBABLE HEMOSTAT (OXIDIZED REGENERATED CELLULOSE, U.S.P.): Brand: SURGICEL

## (undated) NOTE — MR AVS SNAPSHOT
[facility] [place]  [address] [address] [phone]               Thank you for choosing us for your health care visit with Vinny Cody NP. We are glad to serve you and happy to provide you with this summary of your visit. describe the same thing. But they are not always the same. Cystitis is an inflammation of the bladder. The most common cause of cystitis is an infection. Symptoms  The infection causes inflammation in the urethra and bladder.  This causes many of the sympt · You can use acetaminophen or ibuprofen for pain, fever, or discomfort, unless another medicine was prescribed. If you have chronic liver or kidney disease, talk with your healthcare provider before using these medicines.  Also talk with your provider if y · Fainting or loss of consciousness  · Rapid heart rate  When to seek medical advice  Call your healthcare provider right away if any of these occur:  · Fever of 100.4ºF (38. 0ºC) or higher, or as directed by your healthcare provider  · Symptoms are not bet Take 1 tablet (50 mg total) by mouth daily. Commonly known as:  COZAAR           MetFORMIN HCl 1000 MG Tabs   Take 1 tablet (1,000 mg total) by mouth 2 (two) times daily.    Commonly known as:  GLUCOPHAGE           Metoprolol Succinate ER 50 MG Tb24   Tim

## (undated) NOTE — IP AVS SNAPSHOT
Patient Demographics     Address  [address] [place] [address], [address]  Annette Sykes [address] Phone  [phone] [facility]  [phone] [facility] E-mail Address  [email or web address]      Emergency Contact(s)     Name Relation Home Work Mobile    René More Spouse [phone] Do NOT put a pillow under your knee as it may be more difficult to straighten afterwards. No smoking  ? Avoid smoking. It is known to cause breathing problems and can decrease the rate of healing. Incision care/Dressing changes  ?  Wash hands before ? You may need pain medication regularly (every 4-6 hours) the first 2 weeks and then begin to decrease how often you are taking it. ? Take pain medication as prescribed with food, especially before therapy, allowing 30-60 minutes to take effect.   ? Do no hand  as soon as they walk in your house-whether they live there or are visiting. ? Keep bed linen/clothing freshly laundered. ? Do not allow others or pets to touch your incision. ? Avoid people that have colds or the flu. ?  Your surgeon HPQ ? Pain, excessive tenderness, redness, or swelling in leg or calf (other than incision site). (CALL SURGEON)      Go directly to the ER or CALL 911 if  you:  ? become short of breath  ? have chest pain  ?  cough up blood  ? have unexplained anxiety with br Specialties:  SURGERY, ORTHOPEDIC, Surgery, Orthopaedic  Why:  post-op follow up  Contact information:  [address] [place] [address] [facility]             Talon Mcconnell DO.  Schedule an appointment as soon as possible for a visit in 1 Notes to patient:  Contains Tylenol. Do not additional Tylenol within 4 hours of a dose of Norco.      Take 1-2 tablets by mouth every 4 (four) hours as needed.    RODNEY Eli         Losartan Potassium 50 MG Tabs  Commonly known as:  COZAAR      Take Order ID Medication Name Action Time Action Reason Comments    [id number] Losartan Potassium (COZAAR) tab 50 mg 11/04/18 0922 Given      [id number] Metoprolol Succinate ER (Toprol XL) 24 hr tab 50 mg 11/04/18 0538 Given      [id number] PEG 3350 (MIRALAX) pow SpO2  97 % Filed at 11/04/2018 1200      Patient's Most Recent Weight       Most Recent Value   Patient Weight  115.5 kg (254 lb 10.1 oz)         Lab Results Last 24 Hours      BASIC METABOLIC PANEL (8) [[id number]] (Abnormal)  Resulted: 11/04/18 0544, Resu RDW-SD 45.1 35.1 - 46.3 fL — Edward Time Vega Performed By     Lab - Abbreviation Name Director Address Valid Date Range    [address] Kin Edwards  [id number] 02/03/16 1201 - Present Performed by Anup Marquis MD at [facility] MAIN OR   • TOTAL KNEE REPLACEMENT Left 09/21/2016   • UPPER GI ENDOSCOPY,EXAM  6/4/16     Family History   Problem Relation Age of Onset   • Cancer Mother         Multiple myeloma   • Hypertension Mother    • Other ( Consults - MD Consult Notes      Consults signed by Keri Peacock DO at 10/31/2018  2:04 PM     Author:  Keri Peacock DO Service:  — Author Type:  Physician    Filed:  10/31/2018  2:04 PM Date of Service:  10/31/2018  1:29 PM Status:  Signed No Known Allergies[KM. 2]     Home Medications:[KM.1]    Outpatient Medications Marked as Taking for the 10/31/18 encounter [facility] Encounter):  Acetaminophen (ACETAMINOPHEN EXTRA STRENGTH) 500 MG Oral Cap Take 1,000 mg by mouth one time.  Disp:  Rfl:    a • Cancer Father         pancreatic   • Cancer Sister         colon   • Breast Cancer Neg[KM. 2]        Review of Systems  Comprehensive ROS reviewed and negative except for what's stated above.   Including negative for chest pain, shortness of breath, syncop Additional Diagnostics: ECG:[KM.1] normal sinus rhythm, no blocks or conduction defects, no ischemic changes[KM. 3]      Radiology: No results found.      ASSESSMENT / PLAN:     Patient is a[KM.3 70year old[KM.2] female with PMH sig for HTN, HL, DM and GEREMIAS Author:  Smitha Wolff PT Service:  Rehab Author Type:  Physical Therapist    Filed:  11/3/2018  1:49 PM Date of Service:  11/3/2018  1:43 PM Status:  Signed    :  Smitha Wolff PT (Physical Therapist)        PHYSICAL THERAPY KNEE ELLEN Static Sitting: Good  Dynamic Sitting: Good  Static Standing: Fair -  Dynamic Standing: Fair -    ACTIVITY TOLERANCE                         O2 WALK  SPO2 on Room Air at Rest: 86     SPO2 Ambulation on Oxygen: 96  Ambulation oxygen flow (liters per minute) Standing knee flexion 20 reps   Extension stretch  1x     Comments: Pt participated in 1:1 session, tolerance was good.    was present yes   is a     Knee ROM   R Knee Flexion (degrees): 86     R Knee Extension (degrees): 0       Patient En Goal Comments: Goals established on 10/31/2018, in progress[HU.1] 11/3/2018[HU.2]        Attribution Coleman    HU. 1 - Stephan Michelle, PT on 11/3/2018  1:43 PM  HU. 2 - Stephan Michelle, PT on 11/3/2018  1:46 PM               Physical Therapy Note ruby • PONV (postoperative nausea and vomiting)    • Visual impairment     glasses[BR.2]       Past Surgical History[BR.1]  Past Surgical History:   Procedure Laterality Date   • ANGIOGRAM  5/12   • CATARACT Right    • COLONOSCOPY  '10   • COLONOSCOPY  6/4/16 -   Moving to and from a bed to a chair (including a wheelchair)?: None   -   Need to walk in hospital room?: None   -   Climbing 3-5 steps with a railing?: A Little[BR. 2]       AM-PAC Score:[BR.1]  Raw Score: 22   PT Approx Degree of Impairment Score: 20. car transfers, stair training and exercises for strength and flexibility S/P R TKA on 10/31/18. Pt demonstrates  progress in mobility, ROM and strength.  The AM-PAC '6-Clicks' Inpatient Basic Mobility Short Form was completed and this patient is demonstrati :  Radha Morales PTA (Physical Therapy Assistant)        PHYSICAL THERAPY KNEE TREATMENT NOTE - INPATIENT     Room Number: 361/361-A     Session: 3   Number of Visits to Meet Established Goals: 3    Presenting Problem: S/p Cemented  Right TKA Static Standing: Poor +  Dynamic Standing: Poor    ACTIVITY TOLERANCE        Room air  HR 89  O2 83%  AM-PAC '6-Clicks' INPATIENT SHORT FORM - BASIC MOBILITY  How much difficulty does the patient currently have. ..  -   Turning over in bed (including adjust Comments: Pt participated in group session, tolerance was good.  was present yes   is a .     Knee ROM   R Knee Flexion (degrees): 89     R Knee Extension (degrees): 0       Patient End of Session: Up in chair;Needs met;Call light within r Attribution Coleman    BR. 1 - Mani Birmingham PTA on 11/2/2018 12:55 PM               Physical Therapy Note signed by Mani Birmingham PTA at 11/1/2018  2:54 PM  Version 1 of 1    Author:  Mani Birmingham PTA Service:  Rehab Author Type:  Physical OBJECTIVE[BR.1]  Precautions: (Surgical)[BR.2]    WEIGHT BEARING STATUS[BR.1]  Weight Bearing Restriction: R lower extremity        R Lower Extremity: Weight Bearing as Tolerated[BR.2]       PAIN ASSESSMENT[BR.1]   Ratin  Location: Right knee soreness. balance. Pt shows decreased stance time on RLE. Cues provided to improve heel to toe and step through pattern to promote normal gait pattern. PM: Pt attended PM group session for mobility training and exercises.  Pt shows improved tolerance to increas dec ROM, stiffness, pain, and dec activity tolerance. The AM-PAC '6-Clicks' Inpatient Basic Mobility Short Form was completed and this patient is demonstrating a 29% degree of impairment in mobility.  Research supports that patients with this level of impai INFLUENZA 11/03/16     INFLUENZA 10/20/15     INFLUENZA 11/12/14     INFLUENZA 09/10/13     INFLUENZA 09/17/12     INFLUENZA 09/20/11     INFLUENZA 10/27/10     INFLUENZA 10/06/09     INFLUENZA 10/06/08     INFLUENZA 10/09/07     INFLUENZA 11/28/06     Pn

## (undated) NOTE — LETTER
[facility] Testing Department  Phone: [phone]  Right Fax: [phone]  Pikk 20 Willard Rodriguez RN Date: 10/17/18    Patient Name: JS DRAKE [facility]  Surgery Date: 10/31/2018    CSN: [id number]  Medical Record: [id number]   :